# Patient Record
Sex: FEMALE | Race: WHITE | Employment: FULL TIME | URBAN - METROPOLITAN AREA
[De-identification: names, ages, dates, MRNs, and addresses within clinical notes are randomized per-mention and may not be internally consistent; named-entity substitution may affect disease eponyms.]

---

## 2018-11-12 RX ORDER — CEFAZOLIN SODIUM/WATER 2 G/20 ML
2 SYRINGE (ML) INTRAVENOUS ONCE
Status: CANCELLED | OUTPATIENT
Start: 2018-11-12 | End: 2018-11-12

## 2018-11-13 ENCOUNTER — HOSPITAL ENCOUNTER (OUTPATIENT)
Dept: SURGERY | Age: 63
Discharge: HOME | End: 2018-11-13
Attending: ORTHOPAEDIC SURGERY
Payer: COMMERCIAL

## 2018-11-13 ENCOUNTER — HOSPITAL ENCOUNTER (OUTPATIENT)
Dept: PHYSICAL THERAPY | Age: 63
Discharge: HOME | End: 2018-11-13
Payer: COMMERCIAL

## 2018-11-13 VITALS
HEART RATE: 62 BPM | OXYGEN SATURATION: 97 % | SYSTOLIC BLOOD PRESSURE: 112 MMHG | RESPIRATION RATE: 16 BRPM | TEMPERATURE: 98.2 F | WEIGHT: 172.5 LBS | BODY MASS INDEX: 28.74 KG/M2 | HEIGHT: 65 IN | DIASTOLIC BLOOD PRESSURE: 65 MMHG

## 2018-11-13 LAB
ALBUMIN SERPL-MCNC: 3.7 G/DL (ref 3.2–4.6)
ANION GAP SERPL CALC-SCNC: 9 MMOL/L
APPEARANCE UR: ABNORMAL
APTT PPP: 25.3 SEC (ref 23.2–35.3)
ATRIAL RATE: 61 BPM
BACTERIA SPEC CULT: ABNORMAL
BACTERIA URNS QL MICRO: ABNORMAL /HPF
BASOPHILS # BLD: 0.1 K/UL (ref 0–0.2)
BASOPHILS NFR BLD: 1 % (ref 0–2)
BILIRUB UR QL: NEGATIVE
BUN SERPL-MCNC: 18 MG/DL (ref 8–23)
CALCIUM SERPL-MCNC: 9.6 MG/DL (ref 8.3–10.4)
CALCULATED P AXIS, ECG09: 59 DEGREES
CALCULATED R AXIS, ECG10: 56 DEGREES
CALCULATED T AXIS, ECG11: 37 DEGREES
CASTS URNS QL MICRO: ABNORMAL /LPF
CHLORIDE SERPL-SCNC: 104 MMOL/L (ref 98–107)
CO2 SERPL-SCNC: 28 MMOL/L (ref 21–32)
COLOR UR: YELLOW
CREAT SERPL-MCNC: 0.72 MG/DL (ref 0.6–1)
DIAGNOSIS, 93000: NORMAL
DIFFERENTIAL METHOD BLD: NORMAL
EOSINOPHIL # BLD: 0.4 K/UL (ref 0–0.8)
EOSINOPHIL NFR BLD: 6 % (ref 0.5–7.8)
EPI CELLS #/AREA URNS HPF: 0 /HPF
ERYTHROCYTE [DISTWIDTH] IN BLOOD BY AUTOMATED COUNT: 14.2 %
EST. AVERAGE GLUCOSE BLD GHB EST-MCNC: 111 MG/DL
GLUCOSE SERPL-MCNC: 79 MG/DL (ref 65–100)
GLUCOSE UR STRIP.AUTO-MCNC: NEGATIVE MG/DL
HBA1C MFR BLD: 5.5 %
HCT VFR BLD AUTO: 45.3 % (ref 35.8–46.3)
HGB BLD-MCNC: 14.6 G/DL (ref 11.7–15.4)
HGB UR QL STRIP: NEGATIVE
IMM GRANULOCYTES # BLD: 0 K/UL (ref 0–0.5)
IMM GRANULOCYTES NFR BLD AUTO: 0 % (ref 0–5)
INR PPP: 0.9
KETONES UR QL STRIP.AUTO: NEGATIVE MG/DL
LEUKOCYTE ESTERASE UR QL STRIP.AUTO: ABNORMAL
LYMPHOCYTES # BLD: 1.6 K/UL (ref 0.5–4.6)
LYMPHOCYTES NFR BLD: 21 % (ref 13–44)
MCH RBC QN AUTO: 29 PG (ref 26.1–32.9)
MCHC RBC AUTO-ENTMCNC: 32.2 G/DL (ref 31.4–35)
MCV RBC AUTO: 90.1 FL (ref 79.6–97.8)
MONOCYTES # BLD: 0.7 K/UL (ref 0.1–1.3)
MONOCYTES NFR BLD: 9 % (ref 4–12)
NEUTS SEG # BLD: 4.8 K/UL (ref 1.7–8.2)
NEUTS SEG NFR BLD: 62 % (ref 43–78)
NITRITE UR QL STRIP.AUTO: NEGATIVE
NRBC # BLD: 0 K/UL (ref 0–0.2)
P-R INTERVAL, ECG05: 162 MS
PH UR STRIP: 6.5 [PH] (ref 5–9)
PLATELET # BLD AUTO: 288 K/UL (ref 150–450)
PMV BLD AUTO: 9.9 FL (ref 9.4–12.3)
POTASSIUM SERPL-SCNC: 3.8 MMOL/L (ref 3.5–5.1)
PROT UR STRIP-MCNC: NEGATIVE MG/DL
PROTHROMBIN TIME: 11.9 SEC (ref 11.5–14.5)
Q-T INTERVAL, ECG07: 388 MS
QRS DURATION, ECG06: 80 MS
QTC CALCULATION (BEZET), ECG08: 390 MS
RBC # BLD AUTO: 5.03 M/UL (ref 4.05–5.2)
RBC #/AREA URNS HPF: ABNORMAL /HPF
SERVICE CMNT-IMP: ABNORMAL
SODIUM SERPL-SCNC: 141 MMOL/L (ref 136–145)
SP GR UR REFRACTOMETRY: 1.01 (ref 1–1.02)
UROBILINOGEN UR QL STRIP.AUTO: 0.2 EU/DL (ref 0.2–1)
VENTRICULAR RATE, ECG03: 61 BPM
WBC # BLD AUTO: 7.8 K/UL (ref 4.3–11.1)
WBC URNS QL MICRO: ABNORMAL /HPF

## 2018-11-13 PROCEDURE — 87641 MR-STAPH DNA AMP PROBE: CPT

## 2018-11-13 PROCEDURE — 80048 BASIC METABOLIC PNL TOTAL CA: CPT

## 2018-11-13 PROCEDURE — 36415 COLL VENOUS BLD VENIPUNCTURE: CPT

## 2018-11-13 PROCEDURE — 81001 URINALYSIS AUTO W/SCOPE: CPT

## 2018-11-13 PROCEDURE — 82040 ASSAY OF SERUM ALBUMIN: CPT

## 2018-11-13 PROCEDURE — 85610 PROTHROMBIN TIME: CPT

## 2018-11-13 PROCEDURE — 77030027138 HC INCENT SPIROMETER -A

## 2018-11-13 PROCEDURE — 85730 THROMBOPLASTIN TIME PARTIAL: CPT

## 2018-11-13 PROCEDURE — 80307 DRUG TEST PRSMV CHEM ANLYZR: CPT

## 2018-11-13 PROCEDURE — 97161 PT EVAL LOW COMPLEX 20 MIN: CPT

## 2018-11-13 PROCEDURE — 85025 COMPLETE CBC W/AUTO DIFF WBC: CPT

## 2018-11-13 PROCEDURE — 93005 ELECTROCARDIOGRAM TRACING: CPT | Performed by: ORTHOPAEDIC SURGERY

## 2018-11-13 PROCEDURE — 83036 HEMOGLOBIN GLYCOSYLATED A1C: CPT

## 2018-11-13 RX ORDER — IBUPROFEN AND FAMOTIDINE 26.6; 8 MG/1; MG/1
1 TABLET, FILM COATED ORAL 3 TIMES DAILY
COMMUNITY
End: 2018-12-01

## 2018-11-13 RX ORDER — FISH OIL/DHA/EPA 1200-144MG
1 CAPSULE ORAL 3 TIMES DAILY
COMMUNITY
End: 2018-12-01

## 2018-11-13 RX ORDER — ASCORBIC ACID 500 MG
500 TABLET ORAL 4 TIMES DAILY
COMMUNITY

## 2018-11-13 RX ORDER — MULTIVIT WITH MINERALS/HERBS
2 TABLET ORAL 2 TIMES DAILY
COMMUNITY
End: 2018-12-01

## 2018-11-13 NOTE — ACP (ADVANCE CARE PLANNING)
[employer] for [place] received request to offer assistance with [address] in Pre-Assessment. Spoke with nurse to ensure that patient was sufficiently alert and oriented to proceed with consult. Reviewed information with patient. Ms. Reina Ledezma completed an Advance Medical Directive. Original returned to patient and copy provided to unit staff to have scanned into the medical record. Rev.  Tameka Greco MDiv, BS  Board Certified

## 2018-11-13 NOTE — PROGRESS NOTES
[place] [employer] for [facility] received request to offer assistance with [address] in Pre-Assessment. Spoke with nurse to ensure that patient was sufficiently alert and oriented to proceed with consult. Reviewed information with patient. Ms. Garrison Oliva completed an Advance Medical Directive. Original returned to patient and copy provided to unit staff to have scanned into the medical record. Rev. Claude Lawman, MDiv, BS Board Certified [employer]

## 2018-11-13 NOTE — PROGRESS NOTES
Brian Mccoy : (87 y.o.) [address] at [address], [place] [address]. [phone] Physical Therapy Prehab Plan of Treatment and Evaluation Summary:2018 ICD-10: Treatment Diagnosis:  
· Pain in Right Hip (M25.551) · Stiffness of Right Hip, Not elsewhere classified (M25.651) Precautions/Allergies:  
Patient has no allergy information on record. MEDICAL/REFERRING DIAGNOSIS: 
Unilateral primary osteoarthritis, right hip [M16.11] REFERRING PHYSICIAN: Stella Collins MD 
DATE OF SURGERY: 18 Assessment:  
Comments:  She is here with her spouse. She needs a l prakash ONCE THE R is healed. She plans on going home at [place] with her spouse's assistance PROBLEM LIST (Impacting functional limitations): Ms. Garrison Oliva presents with the following right lower extremity(s) problems: 1. Strength 2. Range of Motion 3. Home Exercise Program 
4. Pain INTERVENTIONS PLANNED: 
1. Home Exercise Program 
2. Educational Discussion TREATMENT PLAN: Effective Dates: 2018 TO 2018. Frequency/Duration: Patient to continue to perform home exercise program at least twice per day up until her surgery. GOALS: (Goals have been discussed and agreed upon with patient.) Discharge Goals: Time Frame: 1 Day 1. Patient will demonstrate independence with a home exercise program designed to increase strength, range of motion and pain control to minimize functional deficits and optimize patient for total joint replacement. Rehabilitation Potential For Stated Goals: Good Regarding [facility] therapy, I certify that the treatment plan above will be carried out by a therapist or under their direction. Thank you for this referral, Matt Son, PT     
    
 
 
HISTORY:  
Present Symptoms: 
Pain Intensity 1: 9(at its worst) Pain Location 1: Hip Pain Orientation 1: Anterior, Lateral, Medial, Posterior, Right History of Present Injury/Illness (Reason for Referral): 
Medical/Referring Diagnosis: Unilateral primary osteoarthritis, right hip [M16.11] Past Medical History/Comorbidities: Ms. Margaret Abad  has no past medical history on file. Ms. Margaret Abad  has no past surgical history on file. Social History/Living Environment:  
Home Environment: Private residence # Steps to Enter: 1 Rails to Enter: No 
One/Two Story Residence: One story Living Alone: No 
Support Systems: Spouse/Significant Other/Partner Patient Expects to be Discharged to[de-identified] Private residence Current DME Used/Available at Home: Walker, rolling, Cane, straight(comfort height toilet) Tub or Shower Type: Shower Work/Activity: 
Works as a writer, does a lot of sitting Dominant Side: 
RIGHT Current Medications:  See Pre-assessment nursing note Number of Personal Factors/Comorbidities that affect the Plan of Care: 1-2: MODERATE COMPLEXITY EXAMINATION:  
ADLs (Current Functional Status):  
Ambulation: 
[x] Independent 
[] Walk Indoors Only 
[x] Walk Outdoors [] Use Assistive Device [] Use Wheelchair Only Dressing: 
[x] Independent Requires Assistance from Someone for: 
[] Sock/Shoes 
[] Pants 
[] Everything Bathing/Showering:  
[x] Independent 
[] Requires Assistance from Someone 
[] Sponge Bath Only Household Activities: 
[] Routine house and yard work [x] Light Housework Only 
[] None Observation/Orthostatic Postural Assessment:  
 Leg length discrepancy, left(L LE 1/4\" shorter than R) ROM/Flexibility:  
AROM: Within functional limits(L LE) RLE AROM 
R Hip Flexion: 120 
R Hip ABduction: 25 Strength:  
Strength: Generally decreased, functional(L hip 3+; knee/ankle 4) RLE Strength 
R Hip Flexion: 3+ 
R Hip ABduction: 3+ 
R Knee Extension: 4 
R Ankle Dorsiflexion: 4 Functional Mobility:   
Sensation: Intact(L LE) Stand to Sit: Independent Sit to Stand: Independent Stand Pivot Transfers: Independent Distance (ft): 1000 Feet (ft) Ambulation - Level of Assistance: Modified independent Assistive Device: Cane, straight Base of Support: Widened Speed/Linda: Pace decreased (<100 feet/min) Step Length: Left shortened Stance: Right decreased Gait Abnormalities: Antalgic Balance:   
Sitting: Intact Standing: With support Body Structures Involved: 1. Bones 2. Joints 3. Muscles Body Functions Affected: 1. Neuromusculoskeletal 
2. Movement Related Activities and Participation Affected: 1. General Tasks and Demands 2. Mobility Number of elements that affect the Plan of Care: 4+: HIGH COMPLEXITY CLINICAL PRESENTATION:  
Presentation: Stable and uncomplicated: LOW COMPLEXITY CLINICAL DECISION MAKING:  
Outcome Measure: Tool Used: Lower Extremity Functional Scale (LEFS) Score:  Initial: 13/80 Most Recent: X/80 (Date: -- ) Interpretation of Score: 20 questions each scored on a 5 point scale with 0 representing \"extreme difficulty or unable to perform\" and 4 representing \"no difficulty\". The lower the score, the greater the functional disability. 80/80 represents no disability. Minimal detectable change is 9 points. Score 80 79-65 64-49 48-33 32-17 16-1 0 Modifier CH CI CJ CK CL CM CN  
 
? Mobility - Walking and Moving Around:  
  - CURRENT STATUS: CM - 80%-99% impaired, limited or restricted  - GOAL STATUS: CM - 80%-99% impaired, limited or restricted  - D/C STATUS:  CM - 80%-99% impaired, limited or restricted Medical Necessity:  
· Ms. Ding is expected to optimize her lower extremity strength and ROM in preparation for joint replacement surgery. Reason for Services/Other Comments: · Achieve baseline assesment of musculoskeletal system, functional mobility and home environment. , educate in PT HEP in preparation for surgery, educate in hospital plan of care.   
Use of outcome tool(s) and clinical judgement create a POC that gives a: Clear prediction of patient's progress: LOW COMPLEXITY  
TREATMENT:  
Treatment/Session Assessment:  Patient was instructed in PT- HEP to increase strength and ROM in LEs. Answered all questions. · Post session pain:  2 
· Compliance with Program/Exercises: compliant all of the time. Total Treatment Duration: PT Patient Time In/Time Out Time In: 1100 Time Out: [address] [place] KATHY Padilla

## 2018-11-13 NOTE — PERIOP NOTES
Lab results within anesthesia guidelines. Lab results sent to PCP per surgeon's request. Lab results sent to surgeon. Recent Results (from the past 12 hour(s)) CBC WITH AUTOMATED DIFF Collection Time: 11/13/18 10:40 AM  
Result Value Ref Range WBC 7.8 4.3 - 11.1 K/uL  
 RBC 5.03 4.05 - 5.2 M/uL  
 HGB 14.6 11.7 - 15.4 g/dL HCT 45.3 35.8 - 46.3 % MCV 90.1 79.6 - 97.8 FL  
 MCH 29.0 26.1 - 32.9 PG  
 MCHC 32.2 31.4 - 35.0 g/dL  
 RDW 14.2 % PLATELET 328 185 - 033 K/uL MPV 9.9 9.4 - 12.3 FL ABSOLUTE NRBC 0.00 0.0 - 0.2 K/uL  
 DF AUTOMATED NEUTROPHILS 62 43 - 78 % LYMPHOCYTES 21 13 - 44 % MONOCYTES 9 4.0 - 12.0 % EOSINOPHILS 6 0.5 - 7.8 % BASOPHILS 1 0.0 - 2.0 % IMMATURE GRANULOCYTES 0 0.0 - 5.0 %  
 ABS. NEUTROPHILS 4.8 1.7 - 8.2 K/UL  
 ABS. LYMPHOCYTES 1.6 0.5 - 4.6 K/UL  
 ABS. MONOCYTES 0.7 0.1 - 1.3 K/UL  
 ABS. EOSINOPHILS 0.4 0.0 - 0.8 K/UL  
 ABS. BASOPHILS 0.1 0.0 - 0.2 K/UL  
 ABS. IMM. GRANS. 0.0 0.0 - 0.5 K/UL PROTHROMBIN TIME + INR Collection Time: 11/13/18 10:40 AM  
Result Value Ref Range Prothrombin time 11.9 11.5 - 14.5 sec INR 0.9 PTT Collection Time: 11/13/18 10:40 AM  
Result Value Ref Range aPTT 25.3 23.2 - 35.3 SEC METABOLIC PANEL, BASIC Collection Time: 11/13/18 10:40 AM  
Result Value Ref Range Sodium 141 136 - 145 mmol/L Potassium 3.8 3.5 - 5.1 mmol/L Chloride 104 98 - 107 mmol/L  
 CO2 28 21 - 32 mmol/L Anion gap 9 mmol/L Glucose 79 65 - 100 mg/dL BUN 18 8 - 23 MG/DL Creatinine 0.72 0.6 - 1.0 MG/DL  
 GFR est AA >60 >60 ml/min/1.73m2 GFR est non-AA >60 ml/min/1.73m2 Calcium 9.6 8.3 - 10.4 MG/DL URINALYSIS W/ RFLX MICROSCOPIC Collection Time: 11/13/18 10:40 AM  
Result Value Ref Range Color YELLOW Appearance CLOUDY Specific gravity 1.010 1.001 - 1.023    
 pH (UA) 6.5 5.0 - 9.0 Protein NEGATIVE  NEG mg/dL Glucose NEGATIVE  mg/dL Ketone NEGATIVE  NEG mg/dL Bilirubin NEGATIVE  NEG Blood NEGATIVE  NEG Urobilinogen 0.2 0.2 - 1.0 EU/dL Nitrites NEGATIVE  NEG Leukocyte Esterase LARGE (A) NEG    
 WBC 20-50 0 /hpf  
 RBC 0-3 0 /hpf Epithelial cells 0 0 /hpf Bacteria 1+ (H) 0 /hpf Casts 0-3 0 /lpf ALBUMIN Collection Time: 11/13/18 10:40 AM  
Result Value Ref Range Albumin 3.7 3.2 - 4.6 g/dL HEMOGLOBIN A1C WITH EAG Collection Time: 11/13/18 10:40 AM  
Result Value Ref Range Hemoglobin A1c 5.5 % Est. average glucose 111 mg/dL EKG, 12 LEAD, INITIAL Collection Time: 11/13/18  1:13 PM  
Result Value Ref Range Ventricular Rate 61 BPM  
 Atrial Rate 61 BPM  
 P-R Interval 162 ms QRS Duration 80 ms  
 Q-T Interval 388 ms QTC Calculation (Bezet) 390 ms Calculated P Axis 59 degrees Calculated R Axis 56 degrees Calculated T Axis 37 degrees Diagnosis Normal sinus rhythm Normal ECG No previous ECGs available Confirmed by Bryan Fair ([id number]) on 11/13/2018 1:39:22 PM

## 2018-11-13 NOTE — PERIOP NOTES
Patient verified name and . Order for consent was found in EHR and matches case posting; patient verified. Type 3 surgery, PAT Joint assessment complete. Labs per surgeon: cbc, bmp, UA, PT, PTT, MRSA nasal swab, nicotine, HgbA1C, albumin; results pending. Labs per anesthesia protocol: No additional lab work needed. EKG:Done today- abnormal. Will have anesthesia review EKG. Pt has not had a prior EKG before today. Hibiclens and instructions to return bottle on DOS given per hospital policy. Patient provided with handouts including Guide to Surgery, Pain Management, Hand Hygiene, Blood Transfusion Education, and [place] [facility] Brochure. Patient answered medical/surgical history questions at their best of ability. All prior to admission medications documented in [facility]. Original medication prescription bottle was visualized during patient appointment. Patient instructed to hold all vitamins 7 days prior to surgery and NSAIDS 5 days prior to surgery. Medications to be held: vitamins, supplements and duexis. Patient instructed to continue previous medications as prescribed prior to surgery and to take the following medications the day of surgery according to anesthesia guidelines with a small sip of water: none. Patient teach back successful and patient demonstrates knowledge of instruction.

## 2018-11-14 LAB
COTININE UR QL SCN: NEGATIVE NG/ML
DRUG SCREEN COMMENT:, 753798: NORMAL

## 2018-11-14 NOTE — PROGRESS NOTES
11/13/18 1030 Oxygen Therapy O2 Sat (%) 95 % Pulse via Oximetry 78 beats per minute O2 Device Room air Pre-Treatment Breath Sounds Bilateral Clear Pre FEV1 (liters) 2.4 liters % Predicted 94 Incentive Spirometry Treatment Actual Volume (ml) 1750 ml Initial respiratory Assessment completed with pt. Pt was interviewed and evaluated in Joint camp prior to surgery. Patient ID: 
Salma Ang [id number] 
59 y.o. 
1955 Surgeon: Dr. Hilaria Santiago Date of Surgery: The linked surgery was not found. Please check manually. Procedure: Total Right Hip Arthroplasty Primary Care Physician: Riya, Not On File None Specialists:  
                    
          Pt instructed in the use of Incentive Spirometry. Pt instructed to bring Incentive Spirometer back on date of surgery & to start using Is upon return to pt room. Pt taught proper cough technique History of smoking:   NONE Quit date:        
 
Secondhand smoke:PARENTS Past procedures with Oxygen desaturation:NONE Past Medical History:  
Diagnosis Date  Arthritis Respiratory history:DENIES SOB Respiratory meds:  NA 
 
 
                                
FAMILY PRESENT:            SPOUSE, PAST SLEEP STUDY:                         NO 
HX OF JENELLE:                                         NO JENELLE assessment: SLEEPS ON SIDE     THEN        BACK PHYSICAL EXAM Body mass index is 28.71 kg/m². Visit Vitals /65 (BP 1 Location: Right arm, BP Patient Position: At rest;Sitting) Pulse 62 Temp 98.2 °F (36.8 °C) Resp 16 Ht 5' 5\" (1.651 m) Wt 78.2 kg (172 lb 8 oz) SpO2 97% BMI 28.71 kg/m² Neck circumference:  34  cm Loud snoring: DENIES Witnessed apnea or wakening gasping or choking:,             DENIES, Awakens with headaches:                                                  DENIES Morning or daytime tiredness/ sleepiness:                    DENIES Dry mouth or sore throat in morning:              SOME [place] stage:  2 SACS score:1 
 
STOP/BAN 
 
                         
CPAP:                       NONE Referrals: 
 
Pt. Phone Number:

## 2018-11-14 NOTE — PERIOP NOTES
NICOTINE/COTININE, UR, QT [id number] (Order [id number]) Lab Date: 11/13/2018 Department: [facility] Pre Assessment Released By: Kylie Hernandez RN (auto-released) Authorizing: Curt Keith MD  
Component Value Flag Ref Range Units Status Cotinine Screen, urine NEGATIVE    Yrwkml=154 ng/mL Final

## 2018-11-14 NOTE — PERIOP NOTES
MRSA/MSSA swab reviewed. Pharmacy will review and change antibiotic as appropriate for day of surgery.

## 2018-11-19 NOTE — ADVANCED PRACTICE NURSE
Total Joint Surgery Preoperative Chart Review Patient ID: 
Patricia Berry [id number] 
59 y.o. 
1955 Surgeon: Dr. Amanda Crane Date of Surgery: 2018 Procedure: Total Right Hip Arthroplasty Subjective:  
Patricia Berry is a 59 y.o. WHITE [place]  female who presents for preoperative evaluation for Total Right Hip arthroplasty. This is a preoperative chart review note based on data collected by the nurse at the surgical Pre-Assessment visit. Past Medical History:  
Diagnosis Date  Arthritis Past Surgical History:  
Procedure Laterality Date [address] BIOPSY Right   HX  SECTION Family History Problem Relation Age of Onset  Heart Attack Mother  Lung Disease Mother  Heart Disease Mother  Hypertension Mother [facility] Arthritis-osteo Mother  Lung Disease Father  Cancer Father   
     prostate Social History Tobacco Use  Smoking status: Passive Smoke Exposure - Never Smoker  Smokeless tobacco: Never Used Substance Use Topics  Alcohol use: No  
  Frequency: Never Prior to Admission medications Medication Sig Start Date End Date Taking? Authorizing Provider OTHER,NON-FORMULARY, Pain relief complex 1000/500/120 mg 3 tablets at breakfast, lunch and supper   Yes Provider, Historical  
OTHER,NON-FORMULARY, CarotoMax 5000- one tablet daily   Yes Provider, Historical  
cartilage/collagen/bor/hyalur (JOINT HEALTH PO) Take 2 Tabs by mouth three (3) times daily. Yes Provider, Historical  
OTHER,NON-FORMULARY, Gentle Sleep complex one tablet at bedtime   Yes Provider, Historical  
OTHER,NON-FORMULARY, menopause balance complex one tablet at lunch   Yes Provider, Historical  
fish oil-dha-epa 1,200-144-216 mg cap Take 1 Tab by mouth three (3) times daily.    Yes Provider, Historical  
OTHER,NON-FORMULARY, Herb-Lax 3 tablets per day   Yes Provider, Historical  
ascorbic acid, vitamin C, (VITAMIN C) 500 mg tablet Take 500 mg by mouth four (4) times daily. Yes Provider, Historical  
OTHER,NON-FORMULARY, Lynnette-E complex one tablet TID   Yes Provider, Historical  
LECITHIN PO Take  by mouth two (2) times a day. Yes Provider, Historical  
OTHER,NON-FORMULARY, Take 1 Tab by mouth daily. Mindworks   Yes Provider, Historical  
OTHER,NON-FORMULARY, Ez-gest one tablet daily   Yes Provider, Historical  
B.infantis-B.ani-B.long-B.bifi (PROBIOTIC 4X) 10-15 mg TbEC Take 1 Tab by mouth daily. Yes Provider, Historical  
OTHER,NON-FORMULARY, Sustained release VitalMag 1 tablet daily   Yes Provider, Historical  
OTHER,NON-FORMULARY, Stomach soothing complex 1 tablet TID   Yes Provider, Historical  
OTHER,NON-FORMULARY, Immunity formula 1 one tablet daily   Yes Provider, Historical  
OTHER,NON-FORMULARY, Garlic complex 1 tablet twice daily   Yes Provider, Historical  
OTHER,NON-FORMULARY, Lynnette-Justine 3 tablets TID   Yes Provider, Historical  
ALFALFA PO Take 1 Tab by mouth four (4) times daily. Yes Provider, Historical  
b complex vitamins (B COMPLEX 1) tablet Take 2 Tabs by mouth two (2) times a day. Yes Provider, Historical  
OTHER,NON-FORMULARY, osteomatrix 0.5 serving at breakfast and lunch, one serving at bedtime   Yes Provider, Historical  
ibuprofen-famotidine (DUEXIS) 800-26.6 mg tab Take 1 Tab by mouth three (3) times daily. Yes Provider, Historical  
 
No Known Allergies Objective:  
 
Physical Exam:  
/65 (BP 1 Location: Right arm, BP Patient Position: At rest;Sitting) Pulse 62 Temp 98.2 °F (36.8 °C) Resp 16 Ht 5' 5\" (1.651 m) Wt 78.2 kg (172 lb 8 oz) SpO2 97% BMI 28.71 kg/m² ECG:   
EKG Results Procedure 720 Value Units Date/Time EKG, 12 LEAD, INITIAL [[id number]] Collected:  11/13/18 1313 Order Status:  Completed Updated:  11/13/18 1339 Ventricular Rate 61 BPM   
  Atrial Rate 61 BPM   
  P-R Interval 162 ms QRS Duration 80 ms   
  Q-T Interval 388 ms QTC Calculation (Bezet) 390 ms Calculated P Axis 59 degrees Calculated R Axis 56 degrees Calculated T Axis 37 degrees Diagnosis --  
  Normal sinus rhythm Normal ECG No previous ECGs available Confirmed by Jenny Martinez ([id number]) on 11/13/2018 1:39:22 PM 
  
  
 
 
Data Review:  
Labs:  
 
Results for Tawnya Burks (MRN [id number]) as of 11/19/2018 11:45 Ref. Range 11/13/2018 10:40 Sodium Latest Ref Range: 136 - 145 mmol/L 141 Potassium Latest Ref Range: 3.5 - 5.1 mmol/L 3.8 Chloride Latest Ref Range: 98 - 107 mmol/L 104 CO2 Latest Ref Range: 21 - 32 mmol/L 28 Anion gap Latest Units: mmol/L 9 Glucose Latest Ref Range: 65 - 100 mg/dL 79 BUN Latest Ref Range: 8 - 23 MG/DL 18 Creatinine Latest Ref Range: 0.6 - 1.0 MG/DL 0.72 Calcium Latest Ref Range: 8.3 - 10.4 MG/DL 9.6 GFR est non-AA Latest Units: ml/min/1.73m2 >60  
GFR est AA Latest Ref Range: >60 ml/min/1.73m2 >60 Albumin Latest Ref Range: 3.2 - 4.6 g/dL 3.7 Hemoglobin A1c, (calculated) Latest Units: % 5.5 Est. average glucose Latest Units: mg/dL 111 Total Joint Surgery Pre-Assessment Recommendations:      
none Signed By: Raven Waldrpo NP-C November 19, 2018

## 2018-11-19 NOTE — PERIOP NOTES
Pt called to say that her PCP is Brent Jacobs NP with [facility]. Updated in EMR for PAT appt. Pt also had questions about equipment after surgery- pt informed our  will meet with her after surgery in the hospital to determine equipment needs and ordering.

## 2018-11-29 ENCOUNTER — ANESTHESIA EVENT (OUTPATIENT)
Dept: SURGERY | Age: 63
DRG: 470 | End: 2018-11-29
Payer: COMMERCIAL

## 2018-11-30 ENCOUNTER — HOSPITAL ENCOUNTER (INPATIENT)
Age: 63
LOS: 1 days | Discharge: HOME HEALTH | DRG: 470 | End: 2018-12-01
Attending: ORTHOPAEDIC SURGERY | Admitting: ORTHOPAEDIC SURGERY
Payer: COMMERCIAL

## 2018-11-30 ENCOUNTER — APPOINTMENT (OUTPATIENT)
Dept: GENERAL RADIOLOGY | Age: 63
DRG: 470 | End: 2018-11-30
Attending: ORTHOPAEDIC SURGERY
Payer: COMMERCIAL

## 2018-11-30 ENCOUNTER — ANESTHESIA (OUTPATIENT)
Dept: SURGERY | Age: 63
DRG: 470 | End: 2018-11-30
Payer: COMMERCIAL

## 2018-11-30 DIAGNOSIS — M16.11 PRIMARY OSTEOARTHRITIS OF RIGHT HIP: Primary | ICD-10-CM

## 2018-11-30 PROBLEM — M16.9 OA (OSTEOARTHRITIS) OF HIP: Status: ACTIVE | Noted: 2018-11-30

## 2018-11-30 LAB
ABO + RH BLD: NORMAL
APPEARANCE UR: CLEAR
BILIRUB UR QL: NEGATIVE
BLOOD GROUP ANTIBODIES SERPL: NORMAL
COLOR UR: YELLOW
GLUCOSE BLD STRIP.AUTO-MCNC: 88 MG/DL (ref 65–100)
GLUCOSE UR STRIP.AUTO-MCNC: NEGATIVE MG/DL
HGB BLD-MCNC: 12.7 G/DL (ref 11.7–15.4)
HGB UR QL STRIP: NEGATIVE
KETONES UR QL STRIP.AUTO: NEGATIVE MG/DL
LEUKOCYTE ESTERASE UR QL STRIP.AUTO: NEGATIVE
NITRITE UR QL STRIP.AUTO: NEGATIVE
PH UR STRIP: 7 [PH] (ref 5–9)
PROT UR STRIP-MCNC: NEGATIVE MG/DL
SP GR UR REFRACTOMETRY: 1 (ref 1–1.02)
SPECIMEN EXP DATE BLD: NORMAL
UROBILINOGEN UR QL STRIP.AUTO: 0.2 EU/DL (ref 0.2–1)

## 2018-11-30 PROCEDURE — 74011250636 HC RX REV CODE- 250/636

## 2018-11-30 PROCEDURE — 77030007880 HC KT SPN EPDRL BBMI -B: Performed by: ANESTHESIOLOGY

## 2018-11-30 PROCEDURE — 97110 THERAPEUTIC EXERCISES: CPT

## 2018-11-30 PROCEDURE — 76060000035 HC ANESTHESIA 2 TO 2.5 HR: Performed by: ORTHOPAEDIC SURGERY

## 2018-11-30 PROCEDURE — 77030003666 HC NDL SPINAL BD -A: Performed by: ORTHOPAEDIC SURGERY

## 2018-11-30 PROCEDURE — 74011000250 HC RX REV CODE- 250

## 2018-11-30 PROCEDURE — 77030002933 HC SUT MCRYL J&J -A: Performed by: ORTHOPAEDIC SURGERY

## 2018-11-30 PROCEDURE — 77030011266 HC ELECTRD BLD INSL COVD -A: Performed by: ORTHOPAEDIC SURGERY

## 2018-11-30 PROCEDURE — 74011250636 HC RX REV CODE- 250/636: Performed by: ORTHOPAEDIC SURGERY

## 2018-11-30 PROCEDURE — 94760 N-INVAS EAR/PLS OXIMETRY 1: CPT

## 2018-11-30 PROCEDURE — 97530 THERAPEUTIC ACTIVITIES: CPT

## 2018-11-30 PROCEDURE — 97165 OT EVAL LOW COMPLEX 30 MIN: CPT

## 2018-11-30 PROCEDURE — 86580 TB INTRADERMAL TEST: CPT | Performed by: ORTHOPAEDIC SURGERY

## 2018-11-30 PROCEDURE — 77030033138 HC SUT PGA STRATFX J&J -B: Performed by: ORTHOPAEDIC SURGERY

## 2018-11-30 PROCEDURE — 97161 PT EVAL LOW COMPLEX 20 MIN: CPT

## 2018-11-30 PROCEDURE — 74011000302 HC RX REV CODE- 302: Performed by: ORTHOPAEDIC SURGERY

## 2018-11-30 PROCEDURE — 77030033067 HC SUT PDO STRATFX SPIR J&J -B: Performed by: ORTHOPAEDIC SURGERY

## 2018-11-30 PROCEDURE — 74011250636 HC RX REV CODE- 250/636: Performed by: ANESTHESIOLOGY

## 2018-11-30 PROCEDURE — 74011250637 HC RX REV CODE- 250/637: Performed by: ORTHOPAEDIC SURGERY

## 2018-11-30 PROCEDURE — 77030018836 HC SOL IRR NACL ICUM -A: Performed by: ORTHOPAEDIC SURGERY

## 2018-11-30 PROCEDURE — 82962 GLUCOSE BLOOD TEST: CPT

## 2018-11-30 PROCEDURE — 65270000029 HC RM PRIVATE

## 2018-11-30 PROCEDURE — 77030019557 HC ELECTRD VES SEAL MEDT -F: Performed by: ORTHOPAEDIC SURGERY

## 2018-11-30 PROCEDURE — 77030018074 HC RTVR SUT ARTH4 S&N -B: Performed by: ORTHOPAEDIC SURGERY

## 2018-11-30 PROCEDURE — 77030020782 HC GWN BAIR PAWS FLX 3M -B: Performed by: ANESTHESIOLOGY

## 2018-11-30 PROCEDURE — 85018 HEMOGLOBIN: CPT

## 2018-11-30 PROCEDURE — 74011250637 HC RX REV CODE- 250/637: Performed by: ANESTHESIOLOGY

## 2018-11-30 PROCEDURE — 77030006777 HC BLD SAW OSC CNMD -B: Performed by: ORTHOPAEDIC SURGERY

## 2018-11-30 PROCEDURE — 77030003544 HC NDL EPDRL BD -A: Performed by: ANESTHESIOLOGY

## 2018-11-30 PROCEDURE — 0SR90JA REPLACEMENT OF RIGHT HIP JOINT WITH SYNTHETIC SUBSTITUTE, UNCEMENTED, OPEN APPROACH: ICD-10-PCS | Performed by: ORTHOPAEDIC SURGERY

## 2018-11-30 PROCEDURE — 77030013727 HC IRR FAN PULSVC ZIMM -B: Performed by: ORTHOPAEDIC SURGERY

## 2018-11-30 PROCEDURE — 72170 X-RAY EXAM OF PELVIS: CPT

## 2018-11-30 PROCEDURE — 77030002922 HC SUT FBRWRE ARTH -B: Performed by: ORTHOPAEDIC SURGERY

## 2018-11-30 PROCEDURE — 77030034479 HC ADH SKN CLSR PRINEO J&J -B: Performed by: ORTHOPAEDIC SURGERY

## 2018-11-30 PROCEDURE — 77030034849: Performed by: ORTHOPAEDIC SURGERY

## 2018-11-30 PROCEDURE — 74011000250 HC RX REV CODE- 250: Performed by: ORTHOPAEDIC SURGERY

## 2018-11-30 PROCEDURE — 86901 BLOOD TYPING SEROLOGIC RH(D): CPT

## 2018-11-30 PROCEDURE — 76210000006 HC OR PH I REC 0.5 TO 1 HR: Performed by: ORTHOPAEDIC SURGERY

## 2018-11-30 PROCEDURE — 36415 COLL VENOUS BLD VENIPUNCTURE: CPT

## 2018-11-30 PROCEDURE — C1776 JOINT DEVICE (IMPLANTABLE): HCPCS | Performed by: ORTHOPAEDIC SURGERY

## 2018-11-30 PROCEDURE — 76010000162 HC OR TIME 1.5 TO 2 HR INTENSV-TIER 1: Performed by: ORTHOPAEDIC SURGERY

## 2018-11-30 PROCEDURE — 81003 URINALYSIS AUTO W/O SCOPE: CPT

## 2018-11-30 DEVICE — 132 DEGREE NECK ANGLE HIP STEM
Type: IMPLANTABLE DEVICE | Site: HIP | Status: FUNCTIONAL
Brand: ACCOLADE

## 2018-11-30 DEVICE — 10 DEGREE POLYETHYLENE INSERT
Type: IMPLANTABLE DEVICE | Site: HIP | Status: FUNCTIONAL
Brand: TRIDENT

## 2018-11-30 DEVICE — CERAMIC V40 FEMORAL HEAD
Type: IMPLANTABLE DEVICE | Site: HIP | Status: FUNCTIONAL
Brand: BIOLOX

## 2018-11-30 DEVICE — CANCELLOUS BONE SCREW
Type: IMPLANTABLE DEVICE | Site: HIP | Status: FUNCTIONAL
Brand: TORX

## 2018-11-30 DEVICE — SHELL ACET CLUS H 54E TRTANIUM -- TRIDENT II: Type: IMPLANTABLE DEVICE | Site: HIP | Status: FUNCTIONAL

## 2018-11-30 RX ORDER — NEOMYCIN AND POLYMYXIN B SULFATES 40; 200000 MG/ML; [USP'U]/ML
SOLUTION IRRIGATION AS NEEDED
Status: DISCONTINUED | OUTPATIENT
Start: 2018-11-30 | End: 2018-11-30 | Stop reason: HOSPADM

## 2018-11-30 RX ORDER — KETOROLAC TROMETHAMINE 15 MG/ML
15 INJECTION, SOLUTION INTRAMUSCULAR; INTRAVENOUS EVERY 8 HOURS
Status: DISCONTINUED | OUTPATIENT
Start: 2018-11-30 | End: 2018-12-01 | Stop reason: HOSPADM

## 2018-11-30 RX ORDER — SODIUM CHLORIDE 0.9 % (FLUSH) 0.9 %
5-10 SYRINGE (ML) INJECTION AS NEEDED
Status: DISCONTINUED | OUTPATIENT
Start: 2018-11-30 | End: 2018-11-30 | Stop reason: HOSPADM

## 2018-11-30 RX ORDER — ACETAMINOPHEN 500 MG
1000 TABLET ORAL EVERY 6 HOURS
Status: DISCONTINUED | OUTPATIENT
Start: 2018-12-01 | End: 2018-12-01 | Stop reason: HOSPADM

## 2018-11-30 RX ORDER — CELECOXIB 200 MG/1
200 CAPSULE ORAL EVERY 12 HOURS
Status: DISCONTINUED | OUTPATIENT
Start: 2018-11-30 | End: 2018-12-01 | Stop reason: HOSPADM

## 2018-11-30 RX ORDER — DEXAMETHASONE SODIUM PHOSPHATE 4 MG/ML
INJECTION, SOLUTION INTRA-ARTICULAR; INTRALESIONAL; INTRAMUSCULAR; INTRAVENOUS; SOFT TISSUE AS NEEDED
Status: DISCONTINUED | OUTPATIENT
Start: 2018-11-30 | End: 2018-11-30 | Stop reason: HOSPADM

## 2018-11-30 RX ORDER — LIDOCAINE HYDROCHLORIDE 10 MG/ML
0.3 INJECTION INFILTRATION; PERINEURAL ONCE
Status: COMPLETED | OUTPATIENT
Start: 2018-11-30 | End: 2018-11-30

## 2018-11-30 RX ORDER — NALOXONE HYDROCHLORIDE 0.4 MG/ML
.2-.4 INJECTION, SOLUTION INTRAMUSCULAR; INTRAVENOUS; SUBCUTANEOUS
Status: DISCONTINUED | OUTPATIENT
Start: 2018-11-30 | End: 2018-12-01 | Stop reason: HOSPADM

## 2018-11-30 RX ORDER — SODIUM CHLORIDE 0.9 % (FLUSH) 0.9 %
5-10 SYRINGE (ML) INJECTION AS NEEDED
Status: DISCONTINUED | OUTPATIENT
Start: 2018-11-30 | End: 2018-12-01 | Stop reason: HOSPADM

## 2018-11-30 RX ORDER — TRANEXAMIC ACID 650 1/1
1300 TABLET ORAL
Status: COMPLETED | OUTPATIENT
Start: 2018-11-30 | End: 2018-11-30

## 2018-11-30 RX ORDER — CYCLOBENZAPRINE HCL 10 MG
5 TABLET ORAL 3 TIMES DAILY
Status: DISCONTINUED | OUTPATIENT
Start: 2018-11-30 | End: 2018-12-01 | Stop reason: HOSPADM

## 2018-11-30 RX ORDER — SODIUM CHLORIDE 9 MG/ML
INJECTION INTRAMUSCULAR; INTRAVENOUS; SUBCUTANEOUS AS NEEDED
Status: DISCONTINUED | OUTPATIENT
Start: 2018-11-30 | End: 2018-11-30 | Stop reason: HOSPADM

## 2018-11-30 RX ORDER — SODIUM CHLORIDE, SODIUM LACTATE, POTASSIUM CHLORIDE, CALCIUM CHLORIDE 600; 310; 30; 20 MG/100ML; MG/100ML; MG/100ML; MG/100ML
INJECTION, SOLUTION INTRAVENOUS
Status: DISCONTINUED | OUTPATIENT
Start: 2018-11-30 | End: 2018-11-30 | Stop reason: HOSPADM

## 2018-11-30 RX ORDER — ASCORBIC ACID 500 MG
500 TABLET ORAL 4 TIMES DAILY
Status: DISCONTINUED | OUTPATIENT
Start: 2018-11-30 | End: 2018-12-01 | Stop reason: HOSPADM

## 2018-11-30 RX ORDER — BUPIVACAINE HYDROCHLORIDE 7.5 MG/ML
INJECTION, SOLUTION INTRASPINAL
Status: COMPLETED | OUTPATIENT
Start: 2018-11-30 | End: 2018-11-30

## 2018-11-30 RX ORDER — ONDANSETRON 8 MG/1
8 TABLET, ORALLY DISINTEGRATING ORAL
Status: DISCONTINUED | OUTPATIENT
Start: 2018-11-30 | End: 2018-12-01 | Stop reason: HOSPADM

## 2018-11-30 RX ORDER — DEXAMETHASONE SODIUM PHOSPHATE 100 MG/10ML
10 INJECTION INTRAMUSCULAR; INTRAVENOUS ONCE
Status: DISCONTINUED | OUTPATIENT
Start: 2018-12-01 | End: 2018-12-01 | Stop reason: HOSPADM

## 2018-11-30 RX ORDER — CEFAZOLIN SODIUM/WATER 2 G/20 ML
2 SYRINGE (ML) INTRAVENOUS ONCE
Status: DISCONTINUED | OUTPATIENT
Start: 2018-11-30 | End: 2018-11-30 | Stop reason: SDUPTHER

## 2018-11-30 RX ORDER — ASPIRIN 325 MG
325 TABLET, DELAYED RELEASE (ENTERIC COATED) ORAL EVERY 12 HOURS
Qty: 60 TAB | Refills: 0 | Status: SHIPPED | OUTPATIENT
Start: 2018-11-30

## 2018-11-30 RX ORDER — SODIUM CHLORIDE 0.9 % (FLUSH) 0.9 %
5-10 SYRINGE (ML) INJECTION EVERY 8 HOURS
Status: DISCONTINUED | OUTPATIENT
Start: 2018-11-30 | End: 2018-12-01 | Stop reason: HOSPADM

## 2018-11-30 RX ORDER — CEFAZOLIN SODIUM/WATER 2 G/20 ML
2 SYRINGE (ML) INTRAVENOUS EVERY 8 HOURS
Status: COMPLETED | OUTPATIENT
Start: 2018-11-30 | End: 2018-12-01

## 2018-11-30 RX ORDER — DEXAMETHASONE SODIUM PHOSPHATE 100 MG/10ML
10 INJECTION INTRAMUSCULAR; INTRAVENOUS ONCE
Status: DISCONTINUED | OUTPATIENT
Start: 2018-12-01 | End: 2018-11-30

## 2018-11-30 RX ORDER — GABAPENTIN 100 MG/1
100 CAPSULE ORAL 2 TIMES DAILY
Status: DISCONTINUED | OUTPATIENT
Start: 2018-11-30 | End: 2018-12-01 | Stop reason: HOSPADM

## 2018-11-30 RX ORDER — CELECOXIB 200 MG/1
200 CAPSULE ORAL ONCE
Status: COMPLETED | OUTPATIENT
Start: 2018-11-30 | End: 2018-11-30

## 2018-11-30 RX ORDER — MIDAZOLAM HYDROCHLORIDE 1 MG/ML
INJECTION, SOLUTION INTRAMUSCULAR; INTRAVENOUS AS NEEDED
Status: DISCONTINUED | OUTPATIENT
Start: 2018-11-30 | End: 2018-11-30 | Stop reason: HOSPADM

## 2018-11-30 RX ORDER — OXYCODONE HYDROCHLORIDE 5 MG/1
10 TABLET ORAL
Status: DISCONTINUED | OUTPATIENT
Start: 2018-11-30 | End: 2018-11-30 | Stop reason: HOSPADM

## 2018-11-30 RX ORDER — GABAPENTIN 100 MG/1
100 CAPSULE ORAL 2 TIMES DAILY
Qty: 30 CAP | Refills: 0 | Status: SHIPPED | OUTPATIENT
Start: 2018-11-30

## 2018-11-30 RX ORDER — ACETAMINOPHEN 500 MG
1000 TABLET ORAL ONCE
Status: DISCONTINUED | OUTPATIENT
Start: 2018-11-30 | End: 2018-11-30 | Stop reason: HOSPADM

## 2018-11-30 RX ORDER — SODIUM CHLORIDE, SODIUM LACTATE, POTASSIUM CHLORIDE, CALCIUM CHLORIDE 600; 310; 30; 20 MG/100ML; MG/100ML; MG/100ML; MG/100ML
100 INJECTION, SOLUTION INTRAVENOUS CONTINUOUS
Status: DISCONTINUED | OUTPATIENT
Start: 2018-11-30 | End: 2018-11-30 | Stop reason: HOSPADM

## 2018-11-30 RX ORDER — CEFAZOLIN SODIUM/WATER 2 G/20 ML
2 SYRINGE (ML) INTRAVENOUS ONCE
Status: COMPLETED | OUTPATIENT
Start: 2018-11-30 | End: 2018-11-30

## 2018-11-30 RX ORDER — HYDROMORPHONE HYDROCHLORIDE 2 MG/ML
1 INJECTION, SOLUTION INTRAMUSCULAR; INTRAVENOUS; SUBCUTANEOUS
Status: DISCONTINUED | OUTPATIENT
Start: 2018-11-30 | End: 2018-12-01 | Stop reason: HOSPADM

## 2018-11-30 RX ORDER — EPHEDRINE SULFATE 50 MG/ML
INJECTION, SOLUTION INTRAVENOUS AS NEEDED
Status: DISCONTINUED | OUTPATIENT
Start: 2018-11-30 | End: 2018-11-30 | Stop reason: HOSPADM

## 2018-11-30 RX ORDER — TRANEXAMIC ACID 100 MG/ML
INJECTION, SOLUTION INTRAVENOUS AS NEEDED
Status: DISCONTINUED | OUTPATIENT
Start: 2018-11-30 | End: 2018-11-30 | Stop reason: HOSPADM

## 2018-11-30 RX ORDER — SODIUM CHLORIDE 9 MG/ML
100 INJECTION, SOLUTION INTRAVENOUS CONTINUOUS
Status: DISCONTINUED | OUTPATIENT
Start: 2018-11-30 | End: 2018-12-01 | Stop reason: HOSPADM

## 2018-11-30 RX ORDER — ASPIRIN 325 MG
325 TABLET, DELAYED RELEASE (ENTERIC COATED) ORAL EVERY 12 HOURS
Status: DISCONTINUED | OUTPATIENT
Start: 2018-11-30 | End: 2018-12-01 | Stop reason: HOSPADM

## 2018-11-30 RX ORDER — HYDROMORPHONE HYDROCHLORIDE 2 MG/1
2 TABLET ORAL
Qty: 42 TAB | Refills: 0 | Status: SHIPPED | OUTPATIENT
Start: 2018-11-30

## 2018-11-30 RX ORDER — CYCLOBENZAPRINE HCL 10 MG
5 TABLET ORAL 3 TIMES DAILY
Qty: 30 TAB | Refills: 0 | Status: SHIPPED | OUTPATIENT
Start: 2018-11-30

## 2018-11-30 RX ORDER — AMOXICILLIN 250 MG
2 CAPSULE ORAL DAILY
Qty: 120 TAB | Refills: 0 | Status: SHIPPED | OUTPATIENT
Start: 2018-12-01

## 2018-11-30 RX ORDER — AMOXICILLIN 250 MG
2 CAPSULE ORAL DAILY
Status: DISCONTINUED | OUTPATIENT
Start: 2018-12-01 | End: 2018-12-01 | Stop reason: HOSPADM

## 2018-11-30 RX ORDER — ROPIVACAINE HYDROCHLORIDE 2 MG/ML
INJECTION, SOLUTION EPIDURAL; INFILTRATION; PERINEURAL AS NEEDED
Status: DISCONTINUED | OUTPATIENT
Start: 2018-11-30 | End: 2018-11-30 | Stop reason: HOSPADM

## 2018-11-30 RX ORDER — KETOROLAC TROMETHAMINE 30 MG/ML
INJECTION, SOLUTION INTRAMUSCULAR; INTRAVENOUS AS NEEDED
Status: DISCONTINUED | OUTPATIENT
Start: 2018-11-30 | End: 2018-11-30 | Stop reason: HOSPADM

## 2018-11-30 RX ORDER — ONDANSETRON 2 MG/ML
INJECTION INTRAMUSCULAR; INTRAVENOUS AS NEEDED
Status: DISCONTINUED | OUTPATIENT
Start: 2018-11-30 | End: 2018-11-30 | Stop reason: HOSPADM

## 2018-11-30 RX ORDER — CELECOXIB 200 MG/1
200 CAPSULE ORAL EVERY 12 HOURS
Qty: 60 CAP | Refills: 2 | Status: SHIPPED | OUTPATIENT
Start: 2018-11-30 | End: 2019-02-28

## 2018-11-30 RX ORDER — ACETAMINOPHEN 500 MG
1000 TABLET ORAL EVERY 6 HOURS
Qty: 120 TAB | Refills: 0 | Status: SHIPPED | OUTPATIENT
Start: 2018-12-01

## 2018-11-30 RX ORDER — HYDROMORPHONE HYDROCHLORIDE 2 MG/ML
0.5 INJECTION, SOLUTION INTRAMUSCULAR; INTRAVENOUS; SUBCUTANEOUS
Status: DISCONTINUED | OUTPATIENT
Start: 2018-11-30 | End: 2018-11-30 | Stop reason: HOSPADM

## 2018-11-30 RX ORDER — FENTANYL CITRATE 50 UG/ML
INJECTION, SOLUTION INTRAMUSCULAR; INTRAVENOUS AS NEEDED
Status: DISCONTINUED | OUTPATIENT
Start: 2018-11-30 | End: 2018-11-30 | Stop reason: HOSPADM

## 2018-11-30 RX ORDER — ACETAMINOPHEN 10 MG/ML
1000 INJECTION, SOLUTION INTRAVENOUS ONCE
Status: COMPLETED | OUTPATIENT
Start: 2018-11-30 | End: 2018-11-30

## 2018-11-30 RX ORDER — SODIUM CHLORIDE 0.9 % (FLUSH) 0.9 %
5-10 SYRINGE (ML) INJECTION EVERY 8 HOURS
Status: DISCONTINUED | OUTPATIENT
Start: 2018-11-30 | End: 2018-11-30 | Stop reason: HOSPADM

## 2018-11-30 RX ORDER — PROPOFOL 10 MG/ML
INJECTION, EMULSION INTRAVENOUS
Status: DISCONTINUED | OUTPATIENT
Start: 2018-11-30 | End: 2018-11-30 | Stop reason: HOSPADM

## 2018-11-30 RX ORDER — MIDAZOLAM HYDROCHLORIDE 1 MG/ML
2 INJECTION, SOLUTION INTRAMUSCULAR; INTRAVENOUS
Status: COMPLETED | OUTPATIENT
Start: 2018-11-30 | End: 2018-11-30

## 2018-11-30 RX ORDER — HYDROMORPHONE HYDROCHLORIDE 2 MG/1
2 TABLET ORAL
Status: DISCONTINUED | OUTPATIENT
Start: 2018-11-30 | End: 2018-12-01 | Stop reason: HOSPADM

## 2018-11-30 RX ORDER — DIPHENHYDRAMINE HCL 25 MG
25 CAPSULE ORAL
Status: DISCONTINUED | OUTPATIENT
Start: 2018-11-30 | End: 2018-12-01 | Stop reason: HOSPADM

## 2018-11-30 RX ORDER — ONDANSETRON 8 MG/1
8 TABLET, ORALLY DISINTEGRATING ORAL
Qty: 30 TAB | Refills: 0 | Status: SHIPPED | OUTPATIENT
Start: 2018-11-30

## 2018-11-30 RX ADMIN — FENTANYL CITRATE 50 MCG: 50 INJECTION, SOLUTION INTRAMUSCULAR; INTRAVENOUS at 11:49

## 2018-11-30 RX ADMIN — SODIUM CHLORIDE 100 ML/HR: 900 INJECTION, SOLUTION INTRAVENOUS at 17:00

## 2018-11-30 RX ADMIN — ACETAMINOPHEN 1000 MG: 500 TABLET, FILM COATED ORAL at 23:21

## 2018-11-30 RX ADMIN — BUPIVACAINE HYDROCHLORIDE 14 MG: 7.5 INJECTION, SOLUTION INTRASPINAL at 11:51

## 2018-11-30 RX ADMIN — Medication 3 AMPULE: at 09:12

## 2018-11-30 RX ADMIN — EPHEDRINE SULFATE 15 MG: 50 INJECTION, SOLUTION INTRAVENOUS at 12:10

## 2018-11-30 RX ADMIN — GABAPENTIN 100 MG: 100 CAPSULE ORAL at 20:06

## 2018-11-30 RX ADMIN — DEXAMETHASONE SODIUM PHOSPHATE 10 MG: 4 INJECTION, SOLUTION INTRA-ARTICULAR; INTRALESIONAL; INTRAMUSCULAR; INTRAVENOUS; SOFT TISSUE at 12:31

## 2018-11-30 RX ADMIN — HYDROMORPHONE HYDROCHLORIDE 0.5 MG: 2 INJECTION, SOLUTION INTRAMUSCULAR; INTRAVENOUS; SUBCUTANEOUS at 14:37

## 2018-11-30 RX ADMIN — FENTANYL CITRATE 50 MCG: 50 INJECTION, SOLUTION INTRAMUSCULAR; INTRAVENOUS at 11:57

## 2018-11-30 RX ADMIN — LIDOCAINE HYDROCHLORIDE 0.3 ML: 10 INJECTION, SOLUTION INFILTRATION; PERINEURAL at 09:13

## 2018-11-30 RX ADMIN — MIDAZOLAM 2 MG: 1 INJECTION INTRAMUSCULAR; INTRAVENOUS at 11:13

## 2018-11-30 RX ADMIN — Medication 1 AMPULE: at 20:06

## 2018-11-30 RX ADMIN — Medication 2 G: at 11:57

## 2018-11-30 RX ADMIN — TRANEXAMIC ACID 1000 MG: 100 INJECTION, SOLUTION INTRAVENOUS at 11:52

## 2018-11-30 RX ADMIN — TRANEXAMIC ACID 1300 MG: 650 TABLET, FILM COATED ORAL at 20:06

## 2018-11-30 RX ADMIN — Medication 2 G: at 20:07

## 2018-11-30 RX ADMIN — MIDAZOLAM HYDROCHLORIDE 1 MG: 1 INJECTION, SOLUTION INTRAMUSCULAR; INTRAVENOUS at 13:04

## 2018-11-30 RX ADMIN — TRANEXAMIC ACID 1300 MG: 650 TABLET, FILM COATED ORAL at 23:21

## 2018-11-30 RX ADMIN — HYDROMORPHONE HYDROCHLORIDE 2 MG: 2 TABLET ORAL at 16:38

## 2018-11-30 RX ADMIN — PROPOFOL 25 MCG/KG/MIN: 10 INJECTION, EMULSION INTRAVENOUS at 12:08

## 2018-11-30 RX ADMIN — TRANEXAMIC ACID 1000 MG: 100 INJECTION, SOLUTION INTRAVENOUS at 13:14

## 2018-11-30 RX ADMIN — SODIUM CHLORIDE, SODIUM LACTATE, POTASSIUM CHLORIDE, CALCIUM CHLORIDE: 600; 310; 30; 20 INJECTION, SOLUTION INTRAVENOUS at 11:43

## 2018-11-30 RX ADMIN — CYCLOBENZAPRINE HYDROCHLORIDE 5 MG: 10 TABLET, FILM COATED ORAL at 21:39

## 2018-11-30 RX ADMIN — ONDANSETRON 4 MG: 2 INJECTION INTRAMUSCULAR; INTRAVENOUS at 12:31

## 2018-11-30 RX ADMIN — Medication 10 ML: at 21:40

## 2018-11-30 RX ADMIN — ACETAMINOPHEN 1000 MG: 10 INJECTION, SOLUTION INTRAVENOUS at 18:24

## 2018-11-30 RX ADMIN — EPHEDRINE SULFATE 10 MG: 50 INJECTION, SOLUTION INTRAVENOUS at 12:24

## 2018-11-30 RX ADMIN — MIDAZOLAM HYDROCHLORIDE 1 MG: 1 INJECTION, SOLUTION INTRAMUSCULAR; INTRAVENOUS at 11:49

## 2018-11-30 RX ADMIN — ASPIRIN 325 MG: 325 TABLET, DELAYED RELEASE ORAL at 20:06

## 2018-11-30 RX ADMIN — KETOROLAC TROMETHAMINE 15 MG: 15 INJECTION, SOLUTION INTRAMUSCULAR; INTRAVENOUS at 21:39

## 2018-11-30 RX ADMIN — EPHEDRINE SULFATE 15 MG: 50 INJECTION, SOLUTION INTRAVENOUS at 12:00

## 2018-11-30 RX ADMIN — CELECOXIB 200 MG: 200 CAPSULE ORAL at 20:06

## 2018-11-30 RX ADMIN — EPHEDRINE SULFATE 10 MG: 50 INJECTION, SOLUTION INTRAVENOUS at 13:09

## 2018-11-30 RX ADMIN — OXYCODONE HYDROCHLORIDE AND ACETAMINOPHEN 500 MG: 500 TABLET ORAL at 21:39

## 2018-11-30 RX ADMIN — CELECOXIB 200 MG: 200 CAPSULE ORAL at 09:12

## 2018-11-30 RX ADMIN — SODIUM CHLORIDE, SODIUM LACTATE, POTASSIUM CHLORIDE, AND CALCIUM CHLORIDE 100 ML/HR: 600; 310; 30; 20 INJECTION, SOLUTION INTRAVENOUS at 09:12

## 2018-11-30 RX ADMIN — SODIUM CHLORIDE, SODIUM LACTATE, POTASSIUM CHLORIDE, CALCIUM CHLORIDE: 600; 310; 30; 20 INJECTION, SOLUTION INTRAVENOUS at 12:19

## 2018-11-30 RX ADMIN — HYDROMORPHONE HYDROCHLORIDE 0.5 MG: 2 INJECTION, SOLUTION INTRAMUSCULAR; INTRAVENOUS; SUBCUTANEOUS at 15:37

## 2018-11-30 RX ADMIN — TUBERCULIN PURIFIED PROTEIN DERIVATIVE 5 UNITS: 5 INJECTION, SOLUTION INTRADERMAL at 09:12

## 2018-11-30 RX ADMIN — MIDAZOLAM HYDROCHLORIDE 1 MG: 1 INJECTION, SOLUTION INTRAMUSCULAR; INTRAVENOUS at 11:57

## 2018-11-30 NOTE — ANESTHESIA PREPROCEDURE EVALUATION
Anesthetic History No history of anesthetic complications Review of Systems / Medical History Patient summary reviewed and pertinent labs reviewed Pulmonary Within defined limits Neuro/Psych Within defined limits Cardiovascular Exercise tolerance: >4 METS 
  
GI/Hepatic/Renal 
Within defined limits Endo/Other Arthritis Other Findings Physical Exam 
 
Airway Mallampati: I 
TM Distance: 4 - 6 cm Neck ROM: normal range of motion Mouth opening: Normal 
 
 Cardiovascular Rhythm: regular Rate: normal 
 
 
 
 Dental 
No notable dental hx Pulmonary Breath sounds clear to auscultation Abdominal 
 
 
 
 Other Findings Anesthetic Plan ASA: 1 Anesthesia type: spinal 
 
 
 
 
 
Anesthetic plan and risks discussed with: Patient and Spouse

## 2018-11-30 NOTE — PROGRESS NOTES
Problem: Self Care Deficits Care Plan (Adult) Goal: *Acute Goals and Plan of Care (Insert Text) GOALS:  
DISCHARGE GOALS (in preparation for going home/rehab):  3 days 1. Ms. Anselmo Barajas will perform one lower body dressing activity with minimal assistance with adaptive equipment to demonstrate improved functional mobility and safety. 2.  Ms. Anselmo Barajas will perform one lower body bathing activity with minimal  assistance with adaptive equipment to demonstrate improved functional mobility and safety. 3.  Ms. Anselmo Barajas will perform toileting/toilet transfer with contact guard assistance with adaptive equipment to demonstrate improved functional mobility and safety. 4.  Ms. Anselmo Barajas will perform shower transfer with contact guard assistance with adaptive equipment to demonstrate improved functional mobility and safety. 5.  Ms. Anselmo Barajas will state NURA precautions with two verbal cues to demonstrate improved functional mobility and safety. JOINT CAMP OCCUPATIONAL THERAPY NURA: Initial Assessment 11/30/2018INPATIENT: Hospital Day: 1 Payor: [address] [place] [address] Hwy / Plan: [facility] [employer] / Product Type: Commerical /  
  
NAME/AGE/GENDER: Taj Castañeda is a 59 y.o. female PRIMARY DIAGNOSIS:  Localized osteoarthrosis of right hip [M16.11] Procedure(s) and Anesthesia Type: 
   * HIP ARTHROPLASTY TOTAL/ RIGHT/ XIOMARA - Spinal (Right) ICD-10: Treatment Diagnosis:  
 · Pain in Right Hip (M25.551) · Stiffness of Right Hip, Not elsewhere classified (M25.651) ASSESSMENT:  
Ms. Anselmo Barajas is s/p right NURA and presents with decreased weight bearing on right LE and decreased independence with functional mobility and activities of daily living as compared to prior level of function and safety. Patient would benefit from skilled Occupational Therapy to maximize independence and safety with self-care task and functional mobility.   Pt would also benefit from education on lower body adaptive equipment and hip precautions post-surgery in preparation for going home Patient plans for further rehab at home with home health services and good family support . OT reviewed therapy schedule and plan of care with patient. Patient was able to transfer and perform self care skills as charted below. Patient instructed to call for assistance when needing to get up from the recliner and all needs in reach. Patient verbalized understanding of call light. This section established at most recent assessment PROBLEM LIST (Impairments causing functional limitations): 1. Decreased Strength 2. Decreased ADL/Functional Activities 3. Decreased Transfer Abilities 4. Increased Pain 5. Increased Fatigue 6. Decreased Flexibility/Joint Mobility 7. Decreased Knowledge of Precautions INTERVENTIONS PLANNED: (Benefits and precautions of occupational therapy have been discussed with the patient.) 1. Activities of daily living training 2. Adaptive equipment training 3. Balance training 4. Clothing management 5. Donning&doffing training 6. Theraputic activity TREATMENT PLAN: Frequency/Duration: Follow patient 1-2 times to address above goals. Rehabilitation Potential For Stated Goals: Good RECOMMENDED REHABILITATION/EQUIPMENT: (at time of discharge pending progress): Continue Skilled Therapy and Home Health: Physical Therapy. OCCUPATIONAL PROFILE AND HISTORY:  
History of Present Injury/Illness (Reason for Referral): Pt presents this date s/p (right) NURA. Past Medical History/Comorbidities: Ms. Andrew Jacobo  has a past medical history of Arthritis. Ms. Andrew Jacobo  has a past surgical history that includes hx breast biopsy (Right, ) and hx  section. Social History/Living Environment:  
Home Environment: Private residence Living Alone: No 
Support Systems: Spouse/Significant Other/Partner, Child(karlie) Patient Expects to be Discharged to[de-identified] Private residence Current DME Used/Available at Home: None Prior Level of Function/Work/Activity: 
Independent Number of Personal Factors/Comorbidities that affect the Plan of Care: Brief history (0):  LOW COMPLEXITY ASSESSMENT OF OCCUPATIONAL PERFORMANCE[de-identified]  
Most Recent Physical Functioning:  
Balance Sitting: Intact Standing: Pull to stand; With support Coordination Fine Motor Skills-Upper: Left Intact; Right Intact Gross Motor Skills-Upper: Left Intact; Right Intact Mental Status Neurologic State: Alert Orientation Level: Oriented X4 Cognition: Appropriate decision making Perception: Appears intact Perseveration: No perseveration noted Safety/Judgement: Awareness of environment Basic ADLs (From Assessment) Complex ADLs (From Assessment) Basic ADL Feeding: Independent Oral Facial Hygiene/Grooming: Supervision Bathing: Moderate assistance Upper Body Dressing: Supervision Lower Body Dressing: Moderate assistance Grooming/Bathing/Dressing Activities of Daily Living Cognitive Retraining Safety/Judgement: Awareness of environment Functional Transfers Bathroom Mobility: Minimum assistance Toilet Transfer : Minimum assistance Shower Transfer: Minimum assistance Bed/Mat Mobility Supine to Sit: Contact guard assistance Bed to Chair: Minimum assistance Scooting: Additional time Physical Skills Involved: 1. Range of Motion 2. Balance 3. Strength Cognitive Skills Affected (resulting in the inability to perform in a timely and safe manner): 1. none Psychosocial Skills Affected: 1. Environmental Adaptation Number of elements that affect the Plan of Care: 3-5:  MODERATE COMPLEXITY CLINICAL DECISION MAKING:  
[employer] 6 Clicks Daily Activity Inpatient Short Form How much help from another person does the patient currently need. .. Total A Lot A Little None 1.  Putting on and taking off regular lower body clothing? [] 1   [x] 2   [] 3   [] 4  
2. Bathing (including washing, rinsing, drying)? [] 1   [x] 2   [] 3   [] 4  
3. Toileting, which includes using toilet, bedpan or urinal?   [] 1   [] 2   [x] 3   [] 4  
4. Putting on and taking off regular upper body clothing? [] 1   [] 2   [] 3   [x] 4  
5. Taking care of personal grooming such as brushing teeth? [] 1   [] 2   [] 3   [x] 4  
6. Eating meals? [] 1   [] 2   [] 3   [x] 4  
© 2007, Trustees of [address] [place] [address] [address] [address], under license to [employer]. All rights reserved Score:  Initial: 19 Most Recent: X (Date: -- ) Interpretation of Tool:  Represents activities that are increasingly more difficult (i.e. Bed mobility, Transfers, Gait). Score 24 23 22-20 19-15 14-10 9-7 6 Modifier CH CI CJ CK CL CM CN   
 
? Self Care:  
  - CURRENT STATUS: CK - 40%-59% impaired, limited or restricted  - GOAL STATUS: CJ - 20%-39% impaired, limited or restricted  - D/C STATUS:  ---------------To be determined--------------- Payor: [employer] / Plan: [place] [employer] / Product Type: Commerical /   
 
Medical Necessity:    
· Patient is expected to demonstrate progress in range of motion, balance and functional technique to increase independence with self care. Reason for Services/Other Comments: 
· Patient would benefit from skilled Occupational Therapy to maximize independence and safety with self-care task and functional mobility. Silvino Callahan Use of outcome tool(s) and clinical judgement create a POC that gives a: LOW COMPLEXITY  
  
 
 
 
TREATMENT:  
(In addition to Assessment/Re-Assessment sessions the following treatments were rendered) Pre-treatment Symptoms/Complaints:   
Pain: Initial:  
   Post Session:  0 Therapeutic Activity: (    8min):   Therapeutic activities including Bed transfers, Chair transfers, Ambulation on level ground and  to improve mobility, strength and balance. Required minimal   to promote dynamic balance in standing. Treatment/Session Assessment:   
 Response to Treatment:  Pt up to edge of bed and to chair tolerated well. Education: 
[] Home Exercises [x] Fall Precautions [x] Hip Precautions [] Going Home Video 
[] Knee/Hip Prosthesis Review [x] Walker Management/Safety [x] Adaptive Equipment as Needed Interdisciplinary Collaboration:  
o Physical Therapist 
o Occupational Therapist 
o Registered Nurse After treatment position/precautions:  
o Up in chair 
o Bed/Chair-wheels locked 
o Call light within reach 
o RN notified 
o Family at bedside Compliance with Program/Exercises: Compliant all of the time. Recommendations/Intent for next treatment session:  Treatment next visit will focus on increasing Ms. Ding's independence with bed mobility, transfers, self care, functional mobility, modalities for pain, and patient education. Total Treatment Duration: OT Patient Time In/Time Out Time In: 1700 Time Out: 1589 Eloisa Ya, OT

## 2018-11-30 NOTE — ANESTHESIA PROCEDURE NOTES
Spinal Block Start time: 11/30/2018 11:48 AM 
End time: 11/30/2018 11:51 AM 
Performed by: Harish Brown MD 
Authorized by: Harish Brown MD  
 
Pre-procedure: Indications: primary anesthetic  Preanesthetic Checklist: patient identified, risks and benefits discussed, anesthesia consent, site marked, patient being monitored and timeout performed Timeout Time: 11:46 Spinal Block:  
Patient Position:  Seated Prep Region:  Lumbar Location:  L3-4 Technique:  Single shot Needle:  
Needle Type:  Quincke Needle Gauge:  25 G Attempts:  1 Events: CSF confirmed, no blood with aspiration and no paresthesia Assessment: 
Insertion:  Uncomplicated Patient tolerance:  Patient tolerated the procedure well with no immediate complications

## 2018-11-30 NOTE — PERIOP NOTES
TRANSFER - OUT REPORT: 
 
Verbal report given to [facility] RN on Julisa Tang  being transferred to joint camp room  322 for routine progression of care Report consisted of patients Situation, Background, Assessment and  
Recommendations(SBAR). Information from the following report(s) SBAR, MAR and Accordion was reviewed with the receiving nurse. Opportunity for questions and clarification was provided. Patient transported with: 
 Mobule

## 2018-11-30 NOTE — PROGRESS NOTES
TRANSFER - IN REPORT: 
 
Verbal report received from Abdulkadir Sinclair RN on Ambrosio Nickel  being received from PACU for routine post - op Report consisted of patients Situation, Background, Assessment and  
Recommendations(SBAR). Information from the following report(s) SBAR, Intake/Output and MAR was reviewed with the receiving nurse. Opportunity for questions and clarification was provided. Assessment completed upon patients arrival to unit and care assumed. Oriented to room, bed controls, and how to order meals. ABDs and tape to R hip dry and intact. SCDs to LEs. Yellow gripper socks to feet and instructed not to get up without staff to assist. Moving feet but still has numbness. Medicated for pain with dilaudid po.  in room.

## 2018-11-30 NOTE — PROGRESS NOTES
Ambulated in up with therapist. Back in [address] [place] [address]. NV checks WDL.  in room. Pt tolerated regular diet. No complaints. Ice pack to hip.

## 2018-11-30 NOTE — PERIOP NOTES
Betadine lavage:  17.5cc of betadine lot #[id number]  , exp. Date: 01/20  , 
in 500cc of . 9NS Lot #  [id number], exp. Date : [id number].

## 2018-11-30 NOTE — OP NOTES
Total Hip Procedure Note    Rodney Roberts,  [id number],  1955    Pre-operative Diagnosis:  Localized osteoarthrosis of right hip [M16.11]    Post-operative Diagnosis: Localized osteoarthrosis of right hip [M16.11]    Procedure: Right Total Hip Arthroplasty    BMI: Body mass index is 28.62 kg/m². Kit Graham Location: [address] [place] [address]    Surgeon: Effie Hogan MD    Assistant: None    Anesthesia: Spinal     Complications: None    EBL: 200cc    Drains: None    Case Note: It should be noted that no first assist help was available to help in retraction and/or wound closure. Therefore, if the case took longer than average is should noted that the absence of a first assistant contributed to the increased length of time. In addition, any delay in starting a case in a flip room is assuredly associated with the fact that I had no first assist help and had to close and breakdown drapes myself. Intra-op Findings: Pre-operatively leg lengths were assessed using preop Xrays and with the patient in the lateral decubitus position and operative leg was felt to be 2-3mm shorter in length compared to the contralateral leg. The operative hip showed notable cartilage loss of both the femoral head and acetabulum. No intra-operative periprosthetic fracture was encountered. Sciatic nerve was noted to be intact at the end of the procedure. We measured the distance of our resected femoral head/neck from the cut surface to the center of the femoral head to be approximately 32mm. The overall length replaced with the implanted head/neck was 35mm. Intra-operatively we felt that we restored the patients leg lengths to equal lengths using the method described later. Postop with the patient supine we assessed leg lengths and felt they were similar. Patient condition at completion of Procedure: Stable    Implants:   Implant Name Type Inv.  Item Serial No.  Lot No. LRB No. Used   SHELL [facility] H 54E TRTANIUM -- TRIDENT II - [id number]  SHELL ACET CLUS H 54E TRTANIUM -- Dorthea Chavez II [id number] [employer] HOWM [id number] Right 1   SCR BNE ACET CANC 6.5X35MM -- TRIDENT - [id number]  SCR BNE ACET CANC 6.5X35MM -- TRIDENT [id number] [employer] HOWM [id number] Right 1   LINER ACET TRIDENT 10 DEG SZ E --  - [id number]  LINER ACET TRIDENT 10 DEG SZ E --  [id number] [employer] HOWM [id number] Right 1   HEAD FEM DELT V40 +0MM NK 36MM -- V40 BIOLOX - [id number]  HEAD FEM DELT V40 +0MM NK 36MM -- V40 BIOLOX [id number] [employer] HOWM [id number] Right 1   STEM FEM SZ 5 132D [id number] -- ACCOLADE II V40 - [id number]  STEM FEM SZ 5 132D [id number] -- [employer] [id number] [employer] HOWM [id number] Right 1       Description of Procedure    The complexity of the total joint surgery requires the use of a first assistant for positioning, retraction and assistance in closure. However, none was provided/available    Susan Fletcher was brought to the operating room. Patient was transferred from the stretcher to OR bed. Spinal anesthetic was induced. Hernandez catheter was placed. IV antibiotics were administered per protocol. Susan Fletcher was positioned in the lateral decubitus position and the pelvis/torso stabilized with posts. The limb was prepped and draped in the usual sterile fashion. A time out identifying the patient, procedure, operative side and surgeon was administered and charted by the OR Nurse. Prior to incision one gram of TXA was given intravenously. A standard posterior approach was utilized to expose the hip. The incision was carried through the subcutaneous tissue and underlying fascia with hemostasis obtained using the bovie cauterization and Aquamantys. A Charmley retractor was inserted. We resected any redundent bursal tissue off the external rotators. We were able to identify the piriformis tendon.  The short external rotators and capsule were dissected off the posterior femur as a single layer just superior to the piriformis tendon. The sciatic nerve was palpated and protected during dissection. The femoral head was dislocated. The articular surface revealed loss of cartilage, exposed bone and bone spurring. The neck was osteotomized approximately 1cm above the top of the lesser trochanter. We were careful to protect the greater trochanter during osteotomy and protect it from iatrogenic injury. Acetabular retractors were placed both anterior and posterior just superficial to the acetabular labrum. Remaining acetabular labrum was resected and any soft tissue was removed from the acetabulum including any tissue within the codyloid fossa. The acetabular surface revealed loss of cartilage with exposed bone. The acetabulum was sequentially reamed noting proper anteversion and inclination during reaming. The transverse acetabular ligament was used as the primary anatomic landmark to determine version and inclination. The acetabulum was sized to a 54 mm acetabular component. The prepared acetabulum was irrigated of any residual reamings and soft tissue. The permanent acetabular component was impacted into place to achieve appropriate horizontal tilt, anteversion, bone coverage and stability. We visualize that the acetabular component was fully seated. A trial liner was impacted into position. We did not have to excise overhanging osteophytes anterior and posterior  in order to minimize the risk of impingement. We then turned our attention to the proximal femur. A 2-prong proximal femoral retractor was placed. We gained access to the proximal femur using a  and femoral canal finder. The canal was prepared with appropriate lateralization in which we used the initial smaller broaches to remove lateral bone to avoid varus placement of the femoral component. The canal was then broached progressively. The broach was positioned with the appropriate anatomic femoral anteversion. We broached up to a size 5 Accolade II stem. A calcar planar was used. A trial reduction with a size #5 132 degree Accolade II stem with a +0 neck length and 36 mm head was performed. This was found to be the most stable to flexion greater than 90 degrees and on internal and external rotation. Limb lengths were assessed using three techniques. First, the position of the tip of the operative greater trochanter was compared to the center of the femoral head component and was felt to match this same anatomic relationship as the non-operative hip based on preoperative X-rays. Next, we measured the length of our resected femoral head neck and felt that the trial components matched this resected length as closely as possible when accounting for the length provided by the femoral neck/head. Finally, we compared leg lengths by comparing the possible of the patella with the patients heels together with the patient in the lateral decubitus position. Using these methods it was felt that the patients leg lengths were equilibrated and with appropriate stability as mentioned above. All trial components were removed. Prior to final polyethylene insertion one screw was placed to further stabilize the cup. The final liner was impacted into place which was a 10 degree elevated liner. A cementless size 5 132 degree Accolade II stem was impacted into place carefully. We were careful to observe the calcar region for any iatrogenic fractures during insertion. The permanent femoral head was impacted into place which was a +0mm 36mm ceramic head. Margot Ding's hip was reduced and stability was as mentioned above. Dilute Betadine solution was allowed to sit in the surgical site for a 3 minute period and copious saline was used to irrigate the surgical site. The sciatic nerve was palpated and noted to be intact.  A periarticular of ropivicaine, toradol, and morphine was injected about the surgical site with care being taken not to inject in the vicinity of neurovascular structures. Prior to wound closure one additional gram of TXA was given for a total of 2 grams. The capsule was repaired with a three #2 Fiberwires secured through drill holes placed in the posterior aspect of the trochanter. No drain was placed. The fascia was closed with a  #0 Bidirectional Stratafix barbed suture. The sub-Q layer was closed with 2-0 Vicryl suture and a  3-0 moncril stratafix suture in a running subcuticular fashion was used to close the skin. The incision site was thoroughly cleaned with alcohol and the Exofin wound closure system was applied to seal it off from external contamination after the overlying skin was thoroughly cleaned with alcohol. A thin layer of [place] lubricant was applied over the wound to keep the dressing from adhering to the overlying sterile bandage and said bandage was placed. Drapes were then broken down and patient was transferred carefully back to the OR stretcher. The sponge and needle counts were correct. The patient tolerated the procedure without difficulty and left the operating room in satisfactory condition.     Signed By: Axel Pierce MD

## 2018-11-30 NOTE — ANESTHESIA POSTPROCEDURE EVALUATION
Procedure(s): HIP ARTHROPLASTY TOTAL/ RIGHT/ XIOMARA. Anesthesia Post Evaluation Multimodal analgesia: multimodal analgesia used between 6 hours prior to anesthesia start to PACU discharge Patient location during evaluation: bedside Patient participation: complete - patient participated Level of consciousness: awake Pain management: adequate Airway patency: patent Anesthetic complications: no 
Cardiovascular status: acceptable and stable Respiratory status: acceptable and nasal cannula Hydration status: acceptable Post anesthesia nausea and vomiting:  none Visit Vitals /63 (BP 1 Location: Right arm, BP Patient Position: At rest) Pulse 66 Temp 36.2 °C (97.2 °F) Resp 16 Ht 5' 5\" (1.651 m) Wt 78 kg (171 lb 15.3 oz) SpO2 98% BMI 28.62 kg/m²

## 2018-11-30 NOTE — PROGRESS NOTES
Problem: Mobility Impaired (Adult and Pediatric) Goal: *Acute Goals and Plan of Care (Insert Text) GOALS (1-4 days): 
(1.)Ms. Ding will move from supine to sit and sit to supine  in bed with MODIFIED INDEPENDENCE. (2.)Ms. Ding will transfer from bed to chair and chair to bed with SUPERVISION using the least restrictive device. (3.)Ms. Ding will ambulate with SUPERVISION for 200 feet with the least restrictive device. (4.)Ms. Ding will ambulate up/down 1 steps with No railing with STAND BY ASSIST with walker. (5.)Ms. Ding will state/observe NURA precautions with No verbal cues. ________________________________________________________________________________________________ PHYSICAL THERAPY Joint camp Nura: Initial Assessment, Treatment Day: Day of Assessment, PM 11/30/2018INPATIENT: Hospital Day: 1 Payor: [id number] / Plan: [facility], [facility]. / Product Type: Commerical /  
  
NAME/AGE/GENDER: Ambrosio Joiner is a 59 y.o. female PRIMARY DIAGNOSIS:  Localized osteoarthrosis of right hip [M16.11] Procedure(s) and Anesthesia Type: 
   * HIP ARTHROPLASTY TOTAL/ RIGHT/ XIOMARA - Spinal (Right) ICD-10: Treatment Diagnosis:  
 · Pain in Right Hip (M25.551) · Stiffness of Right Hip, Not elsewhere classified (M25.651) · Difficulty in walking, Not elsewhere classified (R26.2) ASSESSMENT:  
Ms. Paul Giang presents with impaired strength & mobility s/p right NURA. Pt also had decreased stability during out of bed activity. Pt will benefit from follow up therapy to help restore safe function prior to returning home with caregiver. This section established at most recent assessment PROBLEM LIST (Impairments causing functional limitations): 1. Decreased Strength 2. Decreased ADL/Functional Activities 3. Decreased Transfer Abilities 4. Decreased Ambulation Ability/Technique 5. Decreased Balance 6. Increased Pain 7. Decreased Activity Tolerance 8. Decreased Knowledge of Precautions 9. Decreased Cherry with Home Exercise Program 
 INTERVENTIONS PLANNED: (Benefits and precautions of physical therapy have been discussed with the patient.) 1. Bed Mobility 2. Gait Training 3. Home Exercise Program (HEP) 4. Therapeutic Exercise/Strengthening 5. Transfer Training 6. Range of Motion: active/assisted/passive 7. Therapeutic Activities 8. Group Therapy TREATMENT PLAN: Frequency/Duration: Follow patient BID for duration of hospital stay to address above goals. Rehabilitation Potential For Stated Goals: Good RECOMMENDED REHABILITATION/EQUIPMENT: (at time of discharge pending progress): Continue Skilled Therapy and Home Health: Physical Therapy. HISTORY:  
History of Present Injury/Illness (Reason for Referral): Right NURA Past Medical History/Comorbidities: Ms. Shelly Vaughna  has a past medical history of Arthritis. Ms. Shelly Vaughan  has a past surgical history that includes hx breast biopsy (Right, ) and hx  section. Social History/Living Environment:  
Home Environment: Private residence # Steps to Enter: 1 Rails to Enter: No 
One/Two Story Residence: One story Living Alone: No 
Support Systems: Spouse/Significant Other/Partner Patient Expects to be Discharged to[de-identified] Private residence Current DME Used/Available at Home: None Prior Level of Function/Work/Activity: Pt was functioning with a cane ~ 6 weeks prior to this admission Number of Personal Factors/Comorbidities that affect the Plan of Care: 1-2: MODERATE COMPLEXITY EXAMINATION:  
Most Recent Physical Functioning:  
Gross Assessment: Yes Gross Assessment AROM: Generally decreased, functional(left LE) Strength: Generally decreased, functional(left LE) Coordination: Generally decreased, functional(left LE) Bed Mobility Supine to Sit: Contact guard assistance Sit to Supine: Contact guard assistance Scooting: Contact guard assistance Transfers Stand to Sit: Contact guard assistance Stand Pivot Transfers: Contact guard assistance Bed to Chair: Contact guard assistance(with walker) Balance Sitting: Intact; Without support Standing: Impaired; With support(walker) Weight Bearing Status Right Side Weight Bearing: As tolerated Distance (ft): 100 Feet (ft) Ambulation - Level of Assistance: Contact guard assistance Assistive Device: Walker, rolling Speed/Linda: Delayed Step Length: Left shortened Stance: Right decreased Gait Abnormalities: Antalgic;Decreased step clearance Braces/Orthotics: none Right Hip Cold Type: Cold/ice packs Body Structures Involved: 1. Joints 2. Muscles Body Functions Affected: 1. Sensory/Pain 2. Movement Related Activities and Participation Affected: 1. General Tasks and Demands 2. Mobility Number of elements that affect the Plan of Care: 4+: HIGH COMPLEXITY CLINICAL PRESENTATION:  
Presentation: Stable and uncomplicated: LOW COMPLEXITY CLINICAL DECISION MAKIN[address] [place] [id number] AM-PAC 6 Clicks Basic Mobility Inpatient Short Form How much difficulty does the patient currently have. .. Unable A Lot A Little None 1. Turning over in bed (including adjusting bedclothes, sheets and blankets)? [] 1   [] 2   [x] 3   [] 4  
2. Sitting down on and standing up from a chair with arms ( e.g., wheelchair, bedside commode, etc.)   [] 1   [] 2   [x] 3   [] 4  
3. Moving from lying on back to sitting on the side of the bed? [] 1   [] 2   [x] 3   [] 4 How much help from another person does the patient currently need. .. Total A Lot A Little None 4. Moving to and from a bed to a chair (including a wheelchair)? [] 1   [] 2   [x] 3   [] 4  
5. Need to walk in hospital room? [] 1   [] 2   [x] 3   [] 4  
6. Climbing 3-5 steps with a railing? [x] 1   [] 2   [] 3   [] 4  
© , Trustees of [address] [place] [address], under license to [employer]. All rights reserved Score:  Initial: 16 Most Recent: X (Date: -- ) Interpretation of Tool:  Represents activities that are increasingly more difficult (i.e. Bed mobility, Transfers, Gait). Score 24 23 22-20 19-15 14-10 9-7 6 Modifier CH CI CJ CK CL CM CN   
 
? Mobility - Walking and Moving Around:  
  - CURRENT STATUS: CK - 40%-59% impaired, limited or restricted  - GOAL STATUS: CJ - 20%-39% impaired, limited or restricted  - D/C STATUS:  ---------------To be determined--------------- Payor: [employer]. / Plan: [place] [employer]. / Product Type: Commerical /   
 
Medical Necessity:    
· Patient is expected to demonstrate progress in strength, balance, coordination and functional technique to decrease assistance required with bed mobility, trasnfers & gait. Reason for Services/Other Comments: 
· Patient continues to require skilled intervention due to pt not independent with functional mobility. Use of outcome tool(s) and clinical judgement create a POC that gives a: Clear prediction of patient's progress: LOW COMPLEXITY  
  
 
 
 
TREATMENT:  
(In addition to Assessment/Re-Assessment sessions the following treatments were rendered) Pre-treatment Symptoms/Complaints:  none Pain: Initial: visual scale Pain Intensity 1: 3 Pain Location 1: Hip Pain Orientation 1: Right Pain Intervention(s) 1: Ambulation/Increased Activity, Cold pack  Post Session:  3/10 Therapeutic Exercise: (13 Minutes):  Exercises per grid below to improve mobility and dynamic movement of leg - right to improve functional endurance. Required minimal verbal cues to promote proper body alignment and promote proper body mechanics. Progressed repetitions as indicated. Assessment/ 12 min Date: 
11/30 Date: 
 Date: 
  
ACTIVITY/EXERCISE AM PM AM PM AM PM  
GROUP THERAPY  []  []  []  []  []  [] Ankle Pumps  10 Quad Sets  10 Gluteal Sets  10 Hip ABd/ADduction  10      
 Straight Leg Raises  --      
Knee Slides  10 Short Arc Quads  10 [address]  10 Chair Slides B = bilateral; AA = active assistive; A = active; P = passive Treatment/Session Assessment:   
 Response to Treatment:  Tolerated well. Education: 
[x] Home Exercises [x] Fall Precautions [x] Hip Precautions [] D/C Instruction Review 
[] Knee/Hip Prosthesis Review [x] Walker Management/Safety [] Adaptive Equipment as Needed Interdisciplinary Collaboration:  
o Registered Nurse 
o Certified Nursing Assistant/Patient Care Technician After treatment position/precautions:  
o Up in chair 
o Bed/Chair-wheels locked 
o Caregiver at bedside 
o Call light within reach 
o RN notified 
o Family at bedside Compliance with Program/Exercises: Will assess as treatment progresses. Recommendations/Intent for next treatment session:  Treatment next visit will focus on increasing Ms. Ding's independence with bed mobility, transfers, gait training, strength/ROM exercises, modalities for pain, and patient education. Total Treatment Duration: PT Patient Time In/Time Out Time In: 1800 Time Out: 3073 Coralee Dark, PT

## 2018-11-30 NOTE — H&P
[facility] Pre Operative History and Physical Exam 
 
Patient ID: 
Alber Sanchez [id number] 
59 y.o. 
1955 Today: 2018 CC:  Right hip pain HPI:   The patient has end stage arthritis of the right hip. The patient was evaluated and examined in the office prior to today and was found to have a history on physical exam consistent with intra-articular pathology of the right hip. Patient complains of anterior groin pain predominately. Pain occurs during activity. Patient has difficulty putting on socks/shoes and has notable pain when getting up from a chair and getting out of a car. Patient does not complain of significant lateral hip pain or radicular pain down the leg. There have been no changes to the patient's orthopedic condition since the last office visit Past Medical History: 
Past Medical History:  
Diagnosis Date  Arthritis Past Surgical History: 
Past Surgical History:  
Procedure Laterality Date [address] BIOPSY Right   HX  SECTION Medications: 
  
Prior to Admission medications Medication Sig Start Date End Date Taking? Authorizing Provider OTHER,NON-FORMULARY, Pain relief complex 1000/500/120 mg 3 tablets at breakfast, lunch and supper    Provider, Historical  
OTHER,NON-FORMULARY, CarotoMax 5000- one tablet daily    Provider, Historical  
cartilage/collagen/bor/hyalur (JOINT HEALTH PO) Take 2 Tabs by mouth three (3) times daily. Provider, Historical  
OTHER,NON-FORMULARY, Gentle Sleep complex one tablet at bedtime    Provider, Historical  
OTHER,NON-FORMULARY, menopause balance complex one tablet at lunch    Provider, Historical  
fish oil-dha-epa 1,200-144-216 mg cap Take 1 Tab by mouth three (3) times daily.     Provider, Historical  
OTHER,NON-FORMULARY, Herb-Lax 3 tablets per day    Provider, Historical  
ascorbic acid, vitamin C, (VITAMIN C) 500 mg tablet Take 500 mg by mouth four (4) times daily. Provider, Historical  
OTHER,NON-FORMULARY, Lynnette-E complex one tablet TID    Provider, Historical  
LECITHIN PO Take  by mouth two (2) times a day. Provider, Historical  
OTHER,NON-FORMULARY, Take 1 Tab by mouth daily. Mindworks    Provider, Historical  
OTHER,NON-FORMULARY, Ez-gest one tablet daily    Provider, Historical  
B.infantis-B.ani-B.long-B.bifi (PROBIOTIC 4X) 10-15 mg TbEC Take 1 Tab by mouth daily. Provider, Historical  
OTHER,NON-FORMULARY, Sustained release VitalMag 1 tablet daily    Provider, Historical  
OTHER,NON-FORMULARY, Stomach soothing complex 1 tablet TID    Provider, Historical  
OTHER,NON-FORMULARY, Immunity formula 1 one tablet daily    Provider, Historical  
OTHER,NON-FORMULARY, Garlic complex 1 tablet twice daily    Provider, Historical  
OTHER,NON-FORMULARY, Lynnette-Justine 3 tablets TID    Provider, Historical  
ALFALFA PO Take 1 Tab by mouth four (4) times daily. Provider, Historical  
b complex vitamins (B COMPLEX 1) tablet Take 2 Tabs by mouth two (2) times a day. Provider, Historical  
OTHER,NON-FORMULARY, osteomatrix 0.5 serving at breakfast and lunch, one serving at bedtime    Provider, Historical  
ibuprofen-famotidine (DUEXIS) 800-26.6 mg tab Take 1 Tab by mouth three (3) times daily. Provider, Historical  
 
 
Family History: 
  
Family History Problem Relation Age of Onset  Heart Attack Mother  Lung Disease Mother  Heart Disease Mother  Hypertension Mother [address] Arthritis-osteo Mother  Lung Disease Father  Cancer Father   
     prostate Social History:  
  
Social History Tobacco Use  Smoking status: Passive Smoke Exposure - Never Smoker  Smokeless tobacco: Never Used Substance Use Topics  Alcohol use: No  
  Frequency: Never Allergies:  
 No Known Allergies Vitals:  
  
Visit Vitals Ht 5' 5\" (1.651 m) Wt 78 kg (171 lb 15.3 oz) BMI 28.62 kg/m² Objective: General: No Acute distress HEENT: Normocephalic/atramatic Lungs:  Breathing non-labored Heart:   RRR Abdomen: soft Extremities:  Pain with ROM of the right hip which manifests as anterior groin pain. There is decreased internal and external rotation of the affected hip. No significant pain with palpation over the greater trochanteric bursa. No radicular pain with straight leg raise. Patient walks with and antalgic gait. Distally patient has no neurologic deficit. There is no problem list on file for this patient. Assessment: 1. Arthritis of the Right hip Plan: The patient has failed previous conservative treatment for this condition. The patient has pain in the left hip which causes daily symptoms which affects the patient's activities of daily living and quality of life. The risks, benefits, alternatives and possible complications of right total hip arthroplasty have been discussed with the patient including but not limited to infection, bleeding, damage to nerves and blood vessels with particular attention given to risk of damage of the femoral, obturator, lateral femoral cutaneous, superior gluteal, inferior gluteal, and sciatic nerve, risk of dislocation, fracture both intra-op and post-op, limb length inequality, polyethylene wear, implant loosening, risk for continued pain, and risk for revision surgery secondary to but not limited to all of these discussed risks. Further we discussed risk of venous thrombo-embolism including deep vein thrombosis and pulmonary embolism despite being on prophylaxis. We have also previously discussed the potential of morbidity and mortality associated with, but not limited to, the actual surgical procedure, anesthesia, prior medical conditions, and/or the administration of medications perioperatively.  I have made no guarantees to the patient regarding outcomes and the patient has voiced understanding of that. The patient has been given the opportunity to ask questions all of which have been answered and the patient wishes to proceed. The patient will be admitted the day of surgery for right total hip replacement. Signed By: Alejo Martinez MD 
November 30, 2018

## 2018-12-01 ENCOUNTER — HOME HEALTH ADMISSION (OUTPATIENT)
Dept: HOME HEALTH SERVICES | Facility: HOME HEALTH | Age: 63
End: 2018-12-01
Payer: COMMERCIAL

## 2018-12-01 VITALS
WEIGHT: 171.96 LBS | SYSTOLIC BLOOD PRESSURE: 103 MMHG | OXYGEN SATURATION: 100 % | BODY MASS INDEX: 28.65 KG/M2 | RESPIRATION RATE: 16 BRPM | HEIGHT: 65 IN | HEART RATE: 73 BPM | DIASTOLIC BLOOD PRESSURE: 69 MMHG | TEMPERATURE: 97.7 F

## 2018-12-01 LAB
ANION GAP SERPL CALC-SCNC: 8 MMOL/L
BUN SERPL-MCNC: 13 MG/DL (ref 8–23)
CALCIUM SERPL-MCNC: 8.4 MG/DL (ref 8.3–10.4)
CHLORIDE SERPL-SCNC: 108 MMOL/L (ref 98–107)
CO2 SERPL-SCNC: 25 MMOL/L (ref 21–32)
CREAT SERPL-MCNC: 0.7 MG/DL (ref 0.6–1)
GLUCOSE SERPL-MCNC: 108 MG/DL (ref 65–100)
HGB BLD-MCNC: 11 G/DL (ref 11.7–15.4)
POTASSIUM SERPL-SCNC: 4.1 MMOL/L (ref 3.5–5.1)
SODIUM SERPL-SCNC: 141 MMOL/L (ref 136–145)

## 2018-12-01 PROCEDURE — 74011250636 HC RX REV CODE- 250/636: Performed by: ORTHOPAEDIC SURGERY

## 2018-12-01 PROCEDURE — 97535 SELF CARE MNGMENT TRAINING: CPT

## 2018-12-01 PROCEDURE — 85018 HEMOGLOBIN: CPT

## 2018-12-01 PROCEDURE — 36415 COLL VENOUS BLD VENIPUNCTURE: CPT

## 2018-12-01 PROCEDURE — 97150 GROUP THERAPEUTIC PROCEDURES: CPT

## 2018-12-01 PROCEDURE — 97110 THERAPEUTIC EXERCISES: CPT

## 2018-12-01 PROCEDURE — 97116 GAIT TRAINING THERAPY: CPT

## 2018-12-01 PROCEDURE — 80048 BASIC METABOLIC PNL TOTAL CA: CPT

## 2018-12-01 PROCEDURE — 74011250637 HC RX REV CODE- 250/637: Performed by: ORTHOPAEDIC SURGERY

## 2018-12-01 RX ADMIN — ASPIRIN 325 MG: 325 TABLET, DELAYED RELEASE ORAL at 08:46

## 2018-12-01 RX ADMIN — KETOROLAC TROMETHAMINE 15 MG: 15 INJECTION, SOLUTION INTRAMUSCULAR; INTRAVENOUS at 05:33

## 2018-12-01 RX ADMIN — OXYCODONE HYDROCHLORIDE AND ACETAMINOPHEN 500 MG: 500 TABLET ORAL at 08:47

## 2018-12-01 RX ADMIN — SENNOSIDES AND DOCUSATE SODIUM 2 TABLET: 8.6; 5 TABLET ORAL at 08:47

## 2018-12-01 RX ADMIN — Medication 10 ML: at 05:34

## 2018-12-01 RX ADMIN — CYCLOBENZAPRINE HYDROCHLORIDE 5 MG: 10 TABLET, FILM COATED ORAL at 08:46

## 2018-12-01 RX ADMIN — ACETAMINOPHEN 1000 MG: 500 TABLET, FILM COATED ORAL at 05:33

## 2018-12-01 RX ADMIN — GABAPENTIN 100 MG: 100 CAPSULE ORAL at 08:47

## 2018-12-01 RX ADMIN — Medication 2 G: at 04:05

## 2018-12-01 RX ADMIN — Medication 1 AMPULE: at 08:46

## 2018-12-01 RX ADMIN — CELECOXIB 200 MG: 200 CAPSULE ORAL at 08:47

## 2018-12-01 NOTE — PROGRESS NOTES
2018 Post Op day: 1 Day Post-Op Admit Diagnosis: Localized osteoarthrosis of right hip [M16.11] LAB:   
Recent Results (from the past 24 hour(s)) TYPE & SCREEN Collection Time: 18  9:22 AM  
Result Value Ref Range Crossmatch Expiration 2018 ABO/Rh(D) O POSITIVE Antibody screen NEG   
GLUCOSE, POC Collection Time: 18  9:25 AM  
Result Value Ref Range Glucose (POC) 88 65 - 100 mg/dL HEMOGLOBIN Collection Time: 18  7:09 PM  
Result Value Ref Range HGB 12.7 11.7 - 15.4 g/dL URINALYSIS W/ RFLX MICROSCOPIC Collection Time: 18  9:57 PM  
Result Value Ref Range Color YELLOW Appearance CLEAR Specific gravity 1.004 1.001 - 1.023    
 pH (UA) 7.0 5.0 - 9.0 Protein NEGATIVE  NEG mg/dL Glucose NEGATIVE  mg/dL Ketone NEGATIVE  NEG mg/dL Bilirubin NEGATIVE  NEG Blood NEGATIVE  NEG Urobilinogen 0.2 0.2 - 1.0 EU/dL Nitrites NEGATIVE  NEG Leukocyte Esterase NEGATIVE  NEG    
HEMOGLOBIN Collection Time: 18  4:29 AM  
Result Value Ref Range HGB 11.0 (L) 11.7 - 15.4 g/dL METABOLIC PANEL, BASIC Collection Time: 18  4:29 AM  
Result Value Ref Range Sodium 141 136 - 145 mmol/L Potassium 4.1 3.5 - 5.1 mmol/L Chloride 108 (H) 98 - 107 mmol/L  
 CO2 25 21 - 32 mmol/L Anion gap 8 mmol/L Glucose 108 (H) 65 - 100 mg/dL BUN 13 8 - 23 MG/DL Creatinine 0.70 0.6 - 1.0 MG/DL  
 GFR est AA >60 >60 ml/min/1.73m2 GFR est non-AA >60 ml/min/1.73m2 Calcium 8.4 8.3 - 10.4 MG/DL Vital Signs:   
Patient Vitals for the past 8 hrs: 
 BP Temp Pulse Resp SpO2  
18 0700 118/69 98.5 °F (36.9 °C) 84 20 98 % 18 0336 103/66 98.4 °F (36.9 °C) 65 16 95 % Temp (24hrs), Av.8 °F (36.6 °C), Min:97.2 °F (36.2 °C), Max:98.5 °F (36.9 °C) Pain Control:  
Pain Assessment Pain Scale 1: Visual 
Pain Intensity 1: 3 Pain Onset 1: 16 years ago Pain Location 1: Hip Pain Orientation 1: Right Pain Description 1: Other (comment)(scratch) Pain Intervention(s) 1: Ice Subjective: Doing well, no complaints Objective:  No Acute Distress, Alert and Oriented, Neurovascular exam is normal 
  
 
Assessment:  
Patient Active Problem List  
Diagnosis Code  OA (osteoarthritis) of hip M16.9 Status Post Procedure(s) (LRB): HIP ARTHROPLASTY TOTAL/ RIGHT/ XIOMARA (Right) Plan: Continue Physical Therapy, Monitor Hgb. Home today.   
Signed By: Colton Toro MD

## 2018-12-01 NOTE — DISCHARGE INSTRUCTIONS
[address] [facility]   Patient Discharge Instructions    Vasiliy Shepard / [id number] : 1955    Admitted 2018 Discharged: 2018     IF YOU HAVE ANY PROBLEMS ONCE YOU ARE AT HOME CALL THE FOLLOWING NUMBERS:   Main office number: [phone]      · It is important that you take the medication exactly as they are prescribed. · Keep your medication in the bottles provided by the pharmacist and keep a list of the medication names, dosages, and times to be taken in your wallet. · Do not take other medications without consulting your doctor. What to do at [address] [place] [address] Ave your prehospital diet. If you have excessive nausea or vomitting call your doctor's office     Home Physical Therapy is arranged. Use rolling walker when walking. Patients who have had a joint replacement should not drive until you are seen for your follow up appointment by Dr. Yanni Churchill. When to Call    - Call if you have a temperature greater then 101  - Unable to keep food down  - Loose control of your bladder or bowel function  - Are unable to bear any weight   - Need a pain medication refill     Information obtained by :  I understand that if any problems occur once I am at home I am to contact my physician. I understand and acknowledge receipt of the instructions indicated above.                                                                                                                                            Physician's or R.N.'s Signature                                                                  Date/Time                                                                                                                                              Patient or Representative Signature                                                          Date/Time

## 2018-12-01 NOTE — PROGRESS NOTES
Care Management Interventions Transition of Care Consult (CM Consult): Home Health [address]: Yes Discharge Durable Medical Equipment: [facility] from [facility]) Current Support Network: Lives with Spouse Plan discussed with Pt/Family/Caregiver: Yes Freedom of Choice Offered: Yes Discharge Location Discharge Placement: Home with home health

## 2018-12-01 NOTE — PROGRESS NOTES
Problem: Mobility Impaired (Adult and Pediatric) Goal: *Acute Goals and Plan of Care (Insert Text) GOALS (1-4 days): 
(1.)Ms. Ding will move from supine to sit and sit to supine  in bed with MODIFIED INDEPENDENCE. (2.)Ms. Ding will transfer from bed to chair and chair to bed with SUPERVISION using the least restrictive device. Met 12/1 
(3.)Ms. Ding will ambulate with SUPERVISION for 200 feet with the least restrictive device. Met 12/1 
(4.)Ms. Ding will ambulate up/down 1 steps with No railing with STAND BY ASSIST with walker. Met 12/1 
(5.)Ms. Ding will state/observe NURA precautions with No verbal cues. ________________________________________________________________________________________________ PHYSICAL THERAPY Joint camp Nura: Daily Note, Discharge, Treatment Day: 1st, PM 12/1/2018INPATIENT: Hospital Day: 2 Payor: [id number] / Plan: [facility] [place], [facility]. / Product Type: Commerical /  
  
NAME/AGE/GENDER: Parminder Skaggs is a 59 y.o. female PRIMARY DIAGNOSIS:  Localized osteoarthrosis of right hip [M16.11] Procedure(s) and Anesthesia Type: 
   * HIP ARTHROPLASTY TOTAL/ RIGHT/ XIOMARA - Spinal (Right) ICD-10: Treatment Diagnosis:  
 · Pain in Right Hip (M25.551) · Stiffness of Right Hip, Not elsewhere classified (M25.651) · Difficulty in walking, Not elsewhere classified (R26.2) ASSESSMENT:  
Ms. Brii Falcon showed increased gait distance & improved level of assist for transfers & gait in am session. In pm session pt continued to show steady gains with functional mobility & with tolerance to exercises. Ms. Juan Jose Coyle functional progress occurred more rapidly than expected as evidenced by goal attainment. She will be discharged to her home environment with a PT HEP, assistive device(s), and Home Health PT services. This section established at most recent assessment PROBLEM LIST (Impairments causing functional limitations): 1. Decreased Strength 2. Decreased ADL/Functional Activities 3. Decreased Transfer Abilities 4. Decreased Ambulation Ability/Technique 5. Decreased Balance 6. Increased Pain 7. Decreased Activity Tolerance 8. Decreased Knowledge of Precautions 9. Decreased [place] with Home Exercise Program 
 INTERVENTIONS PLANNED: (Benefits and precautions of physical therapy have been discussed with the patient.) 1. Bed Mobility 2. Gait Training 3. Home Exercise Program (HEP) 4. Therapeutic Exercise/Strengthening 5. Transfer Training 6. Range of Motion: active/assisted/passive 7. Therapeutic Activities 8. Group Therapy TREATMENT PLAN: Frequency/Duration: Follow patient BID for duration of hospital stay to address above goals. Rehabilitation Potential For Stated Goals: Good RECOMMENDED REHABILITATION/EQUIPMENT: (at time of discharge pending progress): Continue Skilled Therapy and Home Health: Physical Therapy. HISTORY:  
History of Present Injury/Illness (Reason for Referral): Right NURA Past Medical History/Comorbidities: Ms. Marcus Barron  has a past medical history of Arthritis. Ms. Marcus Barron  has a past surgical history that includes hx breast biopsy (Right, ) and hx  section. Social History/Living Environment:  
Home Environment: Private residence # Steps to Enter: 1 Rails to Enter: No 
One/Two Story Residence: One story Living Alone: No 
Support Systems: Spouse/Significant Other/Partner Patient Expects to be Discharged to[de-identified] Private residence Current DME Used/Available at Home: None Prior Level of Function/Work/Activity: Pt was functioning with a cane ~ 6 weeks prior to this admission Number of Personal Factors/Comorbidities that affect the Plan of Care: 1-2: MODERATE COMPLEXITY EXAMINATION:  
Most Recent Physical Functioning:  
  
  
 
         
 
  
 
Bed Mobility Supine to Sit: (NT) Sit to Supine: Stand-by assistance Scooting: Supervision Transfers Sit to Stand: Supervision Stand to Sit: Supervision Bed to Chair: Supervision(with walker) Balance Sitting: Intact; Without support Standing: Intact; With support(walker) Weight Bearing Status Right Side Weight Bearing: As tolerated Distance (ft): 206 Feet (ft)( x 2) Ambulation - Level of Assistance: Supervision Assistive Device: Walker, rolling Speed/Linda: Fluctuations Step Length: Left shortened Stance: Right decreased Gait Abnormalities: Antalgic;Decreased step clearance Number of Stairs Trained: 1 Stairs - Level of Assistance: Stand-by assistance Rail Use: (none, simulate walker) Braces/Orthotics: none Right Hip Cold Type: Cold/ice packs Body Structures Involved: 1. Joints 2. Muscles Body Functions Affected: 1. Sensory/Pain 2. Movement Related Activities and Participation Affected: 1. General Tasks and Demands 2. Mobility Number of elements that affect the Plan of Care: 4+: HIGH COMPLEXITY CLINICAL PRESENTATION:  
Presentation: Stable and uncomplicated: LOW COMPLEXITY CLINICAL DECISION MAKIN[address] [place] [address] AM-PAC 6 Clicks Basic Mobility Inpatient Short Form How much difficulty does the patient currently have. .. Unable A Lot A Little None 1. Turning over in bed (including adjusting bedclothes, sheets and blankets)? [] 1   [] 2   [] 3   [x] 4  
2. Sitting down on and standing up from a chair with arms ( e.g., wheelchair, bedside commode, etc.)   [] 1   [] 2   [] 3   [x] 4  
3. Moving from lying on back to sitting on the side of the bed? [] 1   [] 2   [] 3   [x] 4 How much help from another person does the patient currently need. .. Total A Lot A Little None 4. Moving to and from a bed to a chair (including a wheelchair)? [] 1   [] 2   [] 3   [x] 4  
5. Need to walk in hospital room? [] 1   [] 2   [] 3   [x] 4  
6. Climbing 3-5 steps with a railing? [] 1   [] 2   [x] 3   [] 4  
© , Trustees of [address] [place] [id number], under license to [employer].  All rights reserved Score:  Initial: 16 Most Recent: 23 (Date: 12/1/18 ) Interpretation of Tool:  Represents activities that are increasingly more difficult (i.e. Bed mobility, Transfers, Gait). Score 24 23 22-20 19-15 14-10 9-7 6 Modifier CH CI CJ CK CL CM CN   
 
? Mobility - Walking and Moving Around:  
  - CURRENT STATUS: CK - 40%-59% impaired, limited or restricted  - GOAL STATUS: CJ - 20%-39% impaired, limited or restricted  - D/C STATUS:  CI - 1%-19% impaired, limited or restricted Payor: [employer] / Plan: [place] [employer] / Product Type: Commerical /   
 
Medical Necessity:    
· Patient is expected to demonstrate progress in strength, balance, coordination and functional technique to decrease assistance required with bed mobility, trasnfers & gait. Reason for Services/Other Comments: 
· Patient continues to require skilled intervention due to pt not independent with functional mobility. Use of outcome tool(s) and clinical judgement create a POC that gives a: Clear prediction of patient's progress: LOW COMPLEXITY  
  
 
 
 
TREATMENT:  
(In addition to Assessment/Re-Assessment sessions the following treatments were rendered) Pre-treatment Symptoms/Complaints:  none Pain: Initial: visual scale Pain Intensity 1: 3 Pain Location 1: Hip Pain Orientation 1: Right Pain Intervention(s) 1: Ambulation/Increased Activity  Post Session:  3/10 Therapeutic Exercise: (45 Minutes(group)):  Exercises per grid below to improve mobility and dynamic movement of leg - right to improve functional endurance. Required minimal verbal cues to promote proper body alignment and promote proper body mechanics. Progressed repetitions as indicated.  
Gait Training (15 Minutes):  Gait training to improve and/or restore physical functioning as related to mobility, strength, balance, coordination and dynamic movement of leg - right to improve functional gait.  Ambulated 206 Feet (ft)( x 2) with Supervision using a Walker, rolling and minimal cues related to their stride length and heel strike to promote proper body alignment and promote proper body mechanics. Date: 
11/30 Date: 
12/1 Date: 
  
ACTIVITY/EXERCISE AM PM AM PM AM PM  
GROUP THERAPY  []  []  []  [x]  []  [] Ankle Pumps  10 15 15 Quad Sets  10 15 15 Gluteal Sets  10 15 15 Hip ABd/ADduction  10 15 15 Straight Leg Raises  -- -- --    
Knee Slides  10 15 15 Short Arc Quads  10 15 15 [address]  10 15 15 Chair Slides B = bilateral; AA = active assistive; A = active; P = passive Treatment/Session Assessment:   
 Response to Treatment: tolerated well Education: 
[x] Home Exercises [x] Fall Precautions [x] Hip Precautions [x] D/C Instruction Review [x] Knee/Hip Prosthesis Review [x] Walker Management/Safety [] Adaptive Equipment as Needed Interdisciplinary Collaboration:  
o Registered Nurse After treatment position/precautions:  
o Supine in bed 
o Bed/Chair-wheels locked 
o Bed in low position 
o Caregiver at bedside 
o Call light within reach 
o RN notified 
o Family at bedside Compliance with Program/Exercises: Will assess as treatment progresses. Recommendations/Intent for next treatment session:  Treatment next visit will focus on increasing Ms. Ding's independence with bed mobility, transfers, gait training, strength/ROM exercises, modalities for pain, and patient education. Total Treatment Duration: PT Patient Time In/Time Out Time In: 1300 Time Out: 1400 Ivonne Davis PT

## 2018-12-01 NOTE — PROGRESS NOTES
Problem: Mobility Impaired (Adult and Pediatric) Goal: *Acute Goals and Plan of Care (Insert Text) GOALS (1-4 days): 
(1.)Ms. Ding will move from supine to sit and sit to supine  in bed with MODIFIED INDEPENDENCE. (2.)Ms. Ding will transfer from bed to chair and chair to bed with SUPERVISION using the least restrictive device. (3.)Ms. Ding will ambulate with SUPERVISION for 200 feet with the least restrictive device. (4.)Ms. Ding will ambulate up/down 1 steps with No railing with STAND BY ASSIST with walker. (5.)Ms. Ding will state/observe NURA precautions with No verbal cues. ________________________________________________________________________________________________ PHYSICAL THERAPY Joint camp Nura: Daily Note, Treatment Day: 1st, AM 12/1/2018INPATIENT: Hospital Day: 2 Payor: [id number] [place] [address] Hwy / Plan: [place] [facility] / Product Type: Commerical /  
  
NAME/AGE/GENDER: Sharron Hauser is a 59 y.o. female PRIMARY DIAGNOSIS:  Localized osteoarthrosis of right hip [M16.11] Procedure(s) and Anesthesia Type: 
   * HIP ARTHROPLASTY TOTAL/ RIGHT/ XIOMARA - Spinal (Right) ICD-10: Treatment Diagnosis:  
 · Pain in Right Hip (M25.551) · Stiffness of Right Hip, Not elsewhere classified (M25.651) · Difficulty in walking, Not elsewhere classified (R26.2) ASSESSMENT:  
Ms. Shelly Vaughan showed increased gait distance & improved level of assist for transfers & gait in am session. This section established at most recent assessment PROBLEM LIST (Impairments causing functional limitations): 1. Decreased Strength 2. Decreased ADL/Functional Activities 3. Decreased Transfer Abilities 4. Decreased Ambulation Ability/Technique 5. Decreased Balance 6. Increased Pain 7. Decreased Activity Tolerance 8. Decreased Knowledge of Precautions 9.  Decreased [place] with Home Exercise Program 
 INTERVENTIONS PLANNED: (Benefits and precautions of physical therapy have been discussed with the patient.) 1. Bed Mobility 2. Gait Training 3. Home Exercise Program (HEP) 4. Therapeutic Exercise/Strengthening 5. Transfer Training 6. Range of Motion: active/assisted/passive 7. Therapeutic Activities 8. Group Therapy TREATMENT PLAN: Frequency/Duration: Follow patient BID for duration of hospital stay to address above goals. Rehabilitation Potential For Stated Goals: Good RECOMMENDED REHABILITATION/EQUIPMENT: (at time of discharge pending progress): Continue Skilled Therapy and Home Health: Physical Therapy. HISTORY:  
History of Present Injury/Illness (Reason for Referral): Right NURA Past Medical History/Comorbidities: Ms. Reina Ledezma  has a past medical history of Arthritis. Ms. Reina Ledezma  has a past surgical history that includes hx breast biopsy (Right, ) and hx  section. Social History/Living Environment:  
Home Environment: Private residence # Steps to Enter: 1 Rails to Enter: No 
One/Two Story Residence: One story Living Alone: No 
Support Systems: Spouse/Significant Other/Partner Patient Expects to be Discharged to[de-identified] Private residence Current DME Used/Available at Home: None Prior Level of Function/Work/Activity: Pt was functioning with a cane ~ 6 weeks prior to this admission Number of Personal Factors/Comorbidities that affect the Plan of Care: 1-2: MODERATE COMPLEXITY EXAMINATION:  
Most Recent Physical Functioning:  
  
  
 
         
 
  
 
Bed Mobility Supine to Sit: (NT) Sit to Supine: (NT) Scooting: Stand-by assistance Transfers Sit to Stand: Stand-by assistance Stand to Sit: Stand-by assistance Bed to Chair: Stand-by assistance(with walker) Balance Sitting: Intact; Without support Standing: Impaired; With support(walker) Weight Bearing Status Right Side Weight Bearing: As tolerated Distance (ft): 150 Feet (ft) Ambulation - Level of Assistance: Stand-by assistance Assistive Device: Walker, rolling Speed/Linda: Delayed Step Length: Left shortened Stance: Right decreased Gait Abnormalities: Antalgic;Decreased step clearance Braces/Orthotics: none Right Hip Cold Type: Cold/ice packs Body Structures Involved: 1. Joints 2. Muscles Body Functions Affected: 1. Sensory/Pain 2. Movement Related Activities and Participation Affected: 1. General Tasks and Demands 2. Mobility Number of elements that affect the Plan of Care: 4+: HIGH COMPLEXITY CLINICAL PRESENTATION:  
Presentation: Stable and uncomplicated: LOW COMPLEXITY CLINICAL DECISION MAKING:  
[facility] [place]-PAC 6 Clicks Basic Mobility Inpatient Short Form How much difficulty does the patient currently have. .. Unable A Lot A Little None 1. Turning over in bed (including adjusting bedclothes, sheets and blankets)? [] 1   [] 2   [x] 3   [] 4  
2. Sitting down on and standing up from a chair with arms ( e.g., wheelchair, bedside commode, etc.)   [] 1   [] 2   [x] 3   [] 4  
3. Moving from lying on back to sitting on the side of the bed? [] 1   [] 2   [x] 3   [] 4 How much help from another person does the patient currently need. .. Total A Lot A Little None 4. Moving to and from a bed to a chair (including a wheelchair)? [] 1   [] 2   [x] 3   [] 4  
5. Need to walk in hospital room? [] 1   [] 2   [x] 3   [] 4  
6. Climbing 3-5 steps with a railing? [x] 1   [] 2   [] 3   [] 4  
© 2007, Trustees of [facility], under license to [employer]. All rights reserved Score:  Initial: 16 Most Recent: X (Date: -- ) Interpretation of Tool:  Represents activities that are increasingly more difficult (i.e. Bed mobility, Transfers, Gait). Score 24 23 22-20 19-15 14-10 9-7 6 Modifier CH CI CJ CK CL CM CN   
 
? Mobility - Walking and Moving Around:  
  - CURRENT STATUS: CK - 40%-59% impaired, limited or restricted  - GOAL STATUS: CJ - 20%-39% impaired, limited or restricted  - D/C STATUS:  ---------------To be determined--------------- Payor: [employer] / Plan: [place] [employer] / Product Type: Commerical /   
 
Medical Necessity:    
· Patient is expected to demonstrate progress in strength, balance, coordination and functional technique to decrease assistance required with bed mobility, trasnfers & gait. Reason for Services/Other Comments: 
· Patient continues to require skilled intervention due to pt not independent with functional mobility. Use of outcome tool(s) and clinical judgement create a POC that gives a: Clear prediction of patient's progress: LOW COMPLEXITY  
  
 
 
 
TREATMENT:  
(In addition to Assessment/Re-Assessment sessions the following treatments were rendered) Pre-treatment Symptoms/Complaints:  Pt pleased with progress so far 
Pain: Initial: visual scale Pain Intensity 1: 3 Pain Location 1: Hip Pain Orientation 1: Right Pain Intervention(s) 1: Ambulation/Increased Activity, Cold pack  Post Session:  3/10 Therapeutic Exercise: (13 Minutes):  Exercises per grid below to improve mobility and dynamic movement of leg - right to improve functional endurance. Required minimal verbal cues to promote proper body alignment and promote proper body mechanics. Progressed repetitions as indicated. Gait Training (12 Minutes):  Gait training to improve and/or restore physical functioning as related to mobility, strength, balance, coordination and dynamic movement of leg - right to improve functional gait. Ambulated 150 Feet (ft) with Stand-by assistance using a Walker, rolling and minimal cues related to their stride length and heel strike to promote proper body alignment and promote proper body mechanics. Date: 
11/30 Date: 
12/1 Date: 
  
ACTIVITY/EXERCISE AM PM AM PM AM PM  
GROUP THERAPY  []  []  []  []  []  [] Ankle Pumps  10 15 Quad Sets  10 15 Gluteal Sets  10 15 Hip ABd/ADduction  10 15     
 Straight Leg Raises  -- --     
Knee Slides  10 15 Short Arc Quads  10 15 [address]  10 15 Chair Slides B = bilateral; AA = active assistive; A = active; P = passive Treatment/Session Assessment:   
 Response to Treatment: pt eager to walk with PT  
 Education: 
[x] Home Exercises [x] Fall Precautions [x] Hip Precautions [] D/C Instruction Review 
[] Knee/Hip Prosthesis Review [x] Walker Management/Safety [] Adaptive Equipment as Needed Interdisciplinary Collaboration:  
o Registered Nurse After treatment position/precautions:  
o Up in chair 
o Bed/Chair-wheels locked 
o Caregiver at bedside 
o Call light within reach 
o RN notified 
o Family at bedside Compliance with Program/Exercises: Will assess as treatment progresses. Recommendations/Intent for next treatment session:  Treatment next visit will focus on increasing Ms. Ding's independence with bed mobility, transfers, gait training, strength/ROM exercises, modalities for pain, and patient education. Total Treatment Duration: PT Patient Time In/Time Out Time In: 0915 Time Out: 0402 Dayanara Thakkar, PT

## 2018-12-01 NOTE — PROGRESS NOTES
Hernandez removed per MD order. Pt tolerated well. No complaints at this time. Call light within reach. Instructed to call for bathroom assistance.

## 2018-12-01 NOTE — PROGRESS NOTES
Shift assessment complete. A&O. No complaints at this time. Neurovascular checks remain in place as ordered, all WDL at this time. No distress noted. Respirations even and unlabored. Hernandez patent and draining clear yellow urine. IVF infusing without difficulty. Call light within reach.

## 2018-12-01 NOTE — PROGRESS NOTES
Shift Assessment Complete. Pt is post op day 1 from [place]. Pt is A&Ox 3.  +2 pedal pulses with purposeful movement in all four extremities. Dressing is clean, dry and intact. Pt denies any pain or need at this time. Bed low and locked. Side rails x3. Call light with in reach. Pt verbalizes understanding of call light.

## 2018-12-01 NOTE — PROGRESS NOTES
Problem: Self Care Deficits Care Plan (Adult) Goal: *Acute Goals and Plan of Care (Insert Text) GOALS:  
DISCHARGE GOALS (in preparation for going home/rehab):  3 days 1. Ms. Guero Morillo will perform one lower body dressing activity with minimal assistance with adaptive equipment to demonstrate improved functional mobility and safety. GOAL MET 12/1/2018 2. Ms. Guero Morillo will perform one lower body bathing activity with minimal  assistance with adaptive equipment to demonstrate improved functional mobility and safety. GOAL MET 12/1/2018 3. Ms. Guero Morillo will perform toileting/toilet transfer with contact guard assistance with adaptive equipment to demonstrate improved functional mobility and safety. GOAL MET 12/1/2018 4. Ms. Guero Morillo will perform shower transfer with contact guard assistance with adaptive equipment to demonstrate improved functional mobility and safety. GOAL MET 12/1/2018 5. Ms. Guero Morillo will state NURA precautions with two verbal cues to demonstrate improved functional mobility and safety. GOAL MET 12/1/2018 JOINT CAMP OCCUPATIONAL THERAPY NURA: Daily Note, Discharge, Treatment Day: 1st and AM 12/1/2018INPATIENT: Hospital Day: 2 Payor: [address] [place] [address] Hwy / Plan: [place] [employer]. / Product Type: Commerical /  
  
NAME/AGE/GENDER: Kem Lopez is a 59 y.o. female PRIMARY DIAGNOSIS:  Localized osteoarthrosis of right hip [M16.11] Procedure(s) and Anesthesia Type: 
   * HIP ARTHROPLASTY TOTAL/ RIGHT/ XIOMARA - Spinal (Right) ICD-10: Treatment Diagnosis:  
 · Pain in Right Hip (M25.551) · Stiffness of Right Hip, Not elsewhere classified (M25.651) ASSESSMENT:  
 Ms. Guero Morillo is s/p R NURA and presents with decreased weight bearing on R LE and decreased independence with functional mobility and activities of daily living. Patient completed shower and dressing as charter below in ADL grid and is ambulating with rolling walker with supervision.   Patient has met 5/5 goals and plans to return home with good family support. Family able to provide patient with appropriate level of assistance at this time. OT reviewed hip precautions throughout session and issued long handled sponge for home use. Patient instructed to call for assistance when needing to get up from recliner and all needs in reach. Patient verbalized understanding of call light. This section established at most recent assessment PROBLEM LIST (Impairments causing functional limitations): 1. Decreased Strength 2. Decreased ADL/Functional Activities 3. Decreased Transfer Abilities 4. Increased Pain 5. Increased Fatigue 6. Decreased Flexibility/Joint Mobility 7. Decreased Knowledge of Precautions INTERVENTIONS PLANNED: (Benefits and precautions of occupational therapy have been discussed with the patient.) 1. Activities of daily living training 2. Adaptive equipment training 3. Balance training 4. Clothing management 5. Donning&doffing training 6. Theraputic activity TREATMENT PLAN: Frequency/Duration: Follow patient 1-2 times to address above goals. Rehabilitation Potential For Stated Goals: Good RECOMMENDED REHABILITATION/EQUIPMENT: (at time of discharge pending progress): Continue Skilled Therapy and Home Health: Physical Therapy. OCCUPATIONAL PROFILE AND HISTORY:  
History of Present Injury/Illness (Reason for Referral): Pt presents this date s/p (right) NURA. Past Medical History/Comorbidities: Ms. Geremias García  has a past medical history of Arthritis. Ms. Greemias García  has a past surgical history that includes hx breast biopsy (Right, ) and hx  section. Social History/Living Environment:  
Home Environment: Private residence # Steps to Enter: 1 Rails to Enter: No 
One/Two Story Residence: One story Living Alone: No 
Support Systems: Spouse/Significant Other/Partner Patient Expects to be Discharged to[de-identified] Private residence Current DME Used/Available at Home: None Prior Level of Function/Work/Activity: 
Independent Number of Personal Factors/Comorbidities that affect the Plan of Care: Brief history (0):  LOW COMPLEXITY ASSESSMENT OF OCCUPATIONAL PERFORMANCE[de-identified]  
Most Recent Physical Functioning:  
Balance Sitting: Intact Standing: Pull to stand; With support LLE Assessment LLE Assessment (WDL): Within defined limits Mental Status Neurologic State: Alert Orientation Level: Oriented X4 Cognition: Appropriate decision making; Appropriate for age attention/concentration Perception: Appears intact Perseveration: No perseveration noted Safety/Judgement: Awareness of environment RLE Assessment RLE Assessment (WDL): Exceptions to [facility] Basic ADLs (From Assessment) Complex ADLs (From Assessment) Basic ADL Feeding: Independent Oral Facial Hygiene/Grooming: Supervision Bathing: Moderate assistance Type of Bath: Chlorhexidine (CHG), Shower Upper Body Dressing: Supervision Lower Body Dressing: Moderate assistance Grooming/Bathing/Dressing Activities of Daily Living Grooming Washing Face: Independent Brushing Teeth: Independent Brushing/Combing Hair: Independent Cognitive Retraining Safety/Judgement: Awareness of environment Upper Body Bathing Bathing Assistance: Modified independent Position Performed: Seated in chair Adaptive Equipment: Shower chair Lower Body Bathing Bathing Assistance: Modified independent Perineal  : Independent Lower Body : Modified independent Adaptive Equipment: Shower chair; Long handled sponge Upper Body Dressing Assistance Dressing Assistance: Independent Bra: Independent Pullover Shirt: Independent Functional Transfers Shower Transfer: Modified independent Lower Body Dressing Assistance Dressing Assistance: Modified independent Underpants: Modified independent Pants With Elastic Waist: Modified independent Socks: Modified independent Cues: Doff; Anselmo Santillan Adaptive Equipment Used: Long handled shoe horn;Reacher;Sock aid Bed/Mat Mobility Supine to Sit: Supervision Sit to Supine: (NT) Sit to Stand: Supervision Bed to Chair: Supervision Scooting: Stand-by assistance Physical Skills Involved: 1. Range of Motion 2. Balance 3. Strength Cognitive Skills Affected (resulting in the inability to perform in a timely and safe manner): 1. none Psychosocial Skills Affected: 1. Environmental Adaptation Number of elements that affect the Plan of Care: 3-5:  MODERATE COMPLEXITY CLINICAL DECISION MAKING:  
[facility] Daily Activity Inpatient Short Form How much help from another person does the patient currently need. .. Total A Lot A Little None 1. Putting on and taking off regular lower body clothing? [] 1   [x] 2   [] 3   [] 4  
2. Bathing (including washing, rinsing, drying)? [] 1   [x] 2   [] 3   [] 4  
3. Toileting, which includes using toilet, bedpan or urinal?   [] 1   [] 2   [x] 3   [] 4  
4. Putting on and taking off regular upper body clothing? [] 1   [] 2   [] 3   [x] 4  
5. Taking care of personal grooming such as brushing teeth? [] 1   [] 2   [] 3   [x] 4  
6. Eating meals? [] 1   [] 2   [] 3   [x] 4  
© 2007, Trustees of [facility] [employer], under license to [employer]. All rights reserved Score:  Initial: 19 Most Recent: X (Date: -- ) Interpretation of Tool:  Represents activities that are increasingly more difficult (i.e. Bed mobility, Transfers, Gait). Score 24 23 22-20 19-15 14-10 9-7 6 Modifier CH CI CJ CK CL CM CN   
 
? Self Care:  
  - CURRENT STATUS: CK - 40%-59% impaired, limited or restricted  - GOAL STATUS: CJ - 20%-39% impaired, limited or restricted  - D/C STATUS:  ---------------To be determined--------------- Payor: [address] [place] [address] Hwy / Plan: [employer] / Product Type: Commerical /   
 
· Use of outcome tool(s) and clinical judgement create a POC that gives a: LOW COMPLEXITY  
  
 
 
 
TREATMENT:  
(In addition to Assessment/Re-Assessment sessions the following treatments were rendered) Pre-treatment Symptoms/Complaints:   
Pain: Initial:  
Pain Intensity 1: 0  Post Session:  0 Self Care: (40): Procedure(s) (per grid) utilized to improve and/or restore self-care/home management as related to dressing, bathing and grooming. Required min verbal cueing to facilitate activities of daily living skills. Treatment/Session Assessment:   
 Response to Treatment:  Pt up to edge of bed and to chair tolerated well. Education: 
[] Home Exercises [x] Fall Precautions [x] Hip Precautions [] Going Home Video 
[] Knee/Hip Prosthesis Review [x] Walker Management/Safety [x] Adaptive Equipment as Needed Interdisciplinary Collaboration:  
o Physical Therapist 
o Certified Occupational Therapy Assistant 
o Registered Nurse After treatment position/precautions:  
o Up in chair 
o Bed/Chair-wheels locked 
o Call light within reach 
o RN notified Compliance with Program/Exercises: Compliant all of the time. Pt doing well all goals met and will do well at home with support from family. Patient will be discharged home with home health PT. No further Occupation Therapy warranted, will discharge Occupational Therapy services. Total Treatment Duration: OT Patient Time In/Time Out Time In: 0800 Time Out: 0840 Lou Ramires 272 Alea Martinez

## 2018-12-01 NOTE — PROGRESS NOTES
[address] Face to Face Encounter Patients Name: Sol Connell    YOB: 1955 Ordering Physician: Ana Kay Primary Diagnosis: RTHA Date of Face to Face:   11/30/2018 Face to Face Encounter findings are related to primary reason for home care:   yes. 1. I certify that the patient needs intermittent care as follows: physical therapy: strengthening 2. I certify that this patient is homebound, that is: 1) patient requires the use of a walker device, special transportation, or assistance of another to leave the home; or 2) patient's condition makes leaving the home medically contraindicated; and 3) patient has a normal inability to leave the home and leaving the home requires considerable and taxing effort. Patient may leave the home for infrequent and short duration for medical reasons, and occasional absences for non-medical reasons. Homebound status is due to the following functional limitations: Patient with strength deficits limiting the performance of all ADL's without caregiver assistance or the use of an assistive device. 3. I certify that this patient is under my care and that I, or a nurse practitioner or  [id number], or clinical nurse specialist, or certified nurse midwife, working with me, had a Face-to-Face Encounter that meets the physician Face-to-Face Encounter requirements. The following are the clinical findings from the [address] [place] [address] encounter that support the need for skilled services and is a summary of the encounter: See hospital chart. See attached progess note Mansoor Omer, CURLYSW 
5/31/2017 THE FOLLOWING TO BE COMPLETED BY THE COMMUNITY PHYSICIAN: 
 
I concur with the findings described above from the [facility] encounter that this patient is homebound and in need of a skilled service. Certifying Physician: _____________________________________ Printed Certifying Physician Name: _____________________________________ Date: _________________

## 2018-12-01 NOTE — PROGRESS NOTES
Assisted pt to bed from recliner. Tolerated well. No complaints at this time. Call light within reach.

## 2018-12-02 ENCOUNTER — HOME CARE VISIT (OUTPATIENT)
Dept: SCHEDULING | Facility: HOME HEALTH | Age: 63
End: 2018-12-02
Payer: COMMERCIAL

## 2018-12-02 VITALS — TEMPERATURE: 97.3 F | RESPIRATION RATE: 16 BRPM

## 2018-12-02 PROCEDURE — 400013 HH SOC

## 2018-12-02 PROCEDURE — G0299 HHS/HOSPICE OF RN EA 15 MIN: HCPCS

## 2018-12-03 ENCOUNTER — HOME CARE VISIT (OUTPATIENT)
Dept: SCHEDULING | Facility: HOME HEALTH | Age: 63
End: 2018-12-03
Payer: COMMERCIAL

## 2018-12-03 VITALS
DIASTOLIC BLOOD PRESSURE: 74 MMHG | RESPIRATION RATE: 17 BRPM | SYSTOLIC BLOOD PRESSURE: 118 MMHG | TEMPERATURE: 98.6 F | HEART RATE: 91 BPM

## 2018-12-03 PROCEDURE — G0151 HHCP-SERV OF PT,EA 15 MIN: HCPCS

## 2018-12-04 ENCOUNTER — HOME CARE VISIT (OUTPATIENT)
Dept: SCHEDULING | Facility: HOME HEALTH | Age: 63
End: 2018-12-04
Payer: COMMERCIAL

## 2018-12-04 VITALS
TEMPERATURE: 99.2 F | DIASTOLIC BLOOD PRESSURE: 76 MMHG | HEART RATE: 70 BPM | RESPIRATION RATE: 18 BRPM | SYSTOLIC BLOOD PRESSURE: 128 MMHG

## 2018-12-04 PROCEDURE — G0157 HHC PT ASSISTANT EA 15: HCPCS

## 2018-12-11 ENCOUNTER — HOME CARE VISIT (OUTPATIENT)
Dept: SCHEDULING | Facility: HOME HEALTH | Age: 63
End: 2018-12-11
Payer: COMMERCIAL

## 2018-12-11 VITALS
RESPIRATION RATE: 16 BRPM | TEMPERATURE: 97.7 F | SYSTOLIC BLOOD PRESSURE: 120 MMHG | HEART RATE: 72 BPM | DIASTOLIC BLOOD PRESSURE: 64 MMHG

## 2018-12-13 ENCOUNTER — HOME CARE VISIT (OUTPATIENT)
Dept: SCHEDULING | Facility: HOME HEALTH | Age: 63
End: 2018-12-13
Payer: COMMERCIAL

## 2018-12-13 VITALS
SYSTOLIC BLOOD PRESSURE: 118 MMHG | RESPIRATION RATE: 16 BRPM | TEMPERATURE: 97 F | DIASTOLIC BLOOD PRESSURE: 64 MMHG | HEART RATE: 76 BPM

## 2018-12-13 PROCEDURE — G0157 HHC PT ASSISTANT EA 15: HCPCS

## 2018-12-17 ENCOUNTER — HOME CARE VISIT (OUTPATIENT)
Dept: SCHEDULING | Facility: HOME HEALTH | Age: 63
End: 2018-12-17
Payer: COMMERCIAL

## 2018-12-17 VITALS
HEART RATE: 74 BPM | TEMPERATURE: 97.2 F | SYSTOLIC BLOOD PRESSURE: 118 MMHG | DIASTOLIC BLOOD PRESSURE: 64 MMHG | RESPIRATION RATE: 16 BRPM

## 2018-12-17 PROCEDURE — G0157 HHC PT ASSISTANT EA 15: HCPCS

## 2018-12-19 ENCOUNTER — HOME CARE VISIT (OUTPATIENT)
Dept: SCHEDULING | Facility: HOME HEALTH | Age: 63
End: 2018-12-19
Payer: COMMERCIAL

## 2018-12-19 VITALS
SYSTOLIC BLOOD PRESSURE: 118 MMHG | RESPIRATION RATE: 17 BRPM | TEMPERATURE: 98.4 F | HEART RATE: 78 BPM | DIASTOLIC BLOOD PRESSURE: 65 MMHG

## 2018-12-19 PROCEDURE — G0151 HHCP-SERV OF PT,EA 15 MIN: HCPCS

## 2019-06-10 ENCOUNTER — HOME HEALTH ADMISSION (OUTPATIENT)
Dept: HOME HEALTH SERVICES | Facility: HOME HEALTH | Age: 64
End: 2019-06-10

## 2022-03-18 PROBLEM — M16.9 OA (OSTEOARTHRITIS) OF HIP: Status: ACTIVE | Noted: 2018-11-30

## 2024-09-20 ENCOUNTER — TRANSCRIBE ORDERS (OUTPATIENT)
Dept: SCHEDULING | Age: 69
End: 2024-09-20

## 2024-09-20 DIAGNOSIS — Z12.31 SCREENING MAMMOGRAM FOR HIGH-RISK PATIENT: Primary | ICD-10-CM

## 2024-10-17 ENCOUNTER — HOSPITAL ENCOUNTER (OUTPATIENT)
Dept: MAMMOGRAPHY | Age: 69
Discharge: HOME | End: 2024-10-20
Payer: COMMERCIAL

## 2024-10-17 VITALS — WEIGHT: 180 LBS | HEIGHT: 65 IN | BODY MASS INDEX: 29.99 KG/M2

## 2024-10-17 DIAGNOSIS — Z12.31 ENCOUNTER FOR SCREENING MAMMOGRAM FOR HIGH-RISK PATIENT: ICD-10-CM

## 2024-10-17 PROCEDURE — 77063 BREAST TOMOSYNTHESIS BI: CPT

## (undated) DEVICE — TRAY PREP DRY W/ PREM GLV 2 APPL 6 SPNG 2 UNDPD 1 OVERWRAP

## (undated) DEVICE — AMD ANTIMICROBIAL NON-ADHERENT PAD,0.2% POLYHEXAMETHYLENE BIGUANIDE HCI (PHMB): Brand: TELFA

## (undated) DEVICE — 1010 S-DRAPE TOWEL DRAPE 10/BX: Brand: STERI-DRAPE™

## (undated) DEVICE — GOWN,AURORA,FABRIC-REINFORCED,2XL: Brand: MEDLINE

## (undated) DEVICE — FAN SPRAY KIT: Brand: PULSAVAC®

## (undated) DEVICE — SUTURE MCRYL SZ 2-0 L27IN ABSRB UD CP-1 1 L36MM 1/2 CIR REV Y266H

## (undated) DEVICE — NEEDLE HYPO 21GA L1.5IN INTRAMUSCULAR S STL LATCH BVL UP

## (undated) DEVICE — SUTURE FIBERWIRE SZ 2 W/ TAPERED NEEDLE BLUE L38IN NONABSORB BLU L26.5MM 1/2 CIRCLE AR7200

## (undated) DEVICE — HEWSON SUTURE RETRIEVER: Brand: HEWSON SUTURE RETRIEVER

## (undated) DEVICE — MEDI-VAC YANKAUER SUCTION HANDLE W/BULBOUS TIP: Brand: CARDINAL HEALTH

## (undated) DEVICE — (D)PREP SKN CHLRAPRP APPL 26ML -- CONVERT TO ITEM 371833

## (undated) DEVICE — 2000CC GUARDIAN II: Brand: GUARDIAN

## (undated) DEVICE — STOCKINETTE TUBE 6X48 -- MEDICHOICE

## (undated) DEVICE — 3M™ STERI-DRAPE™ INCISE DRAPE, XL 1051: Brand: STERI-DRAPE™

## (undated) DEVICE — BUTTON SWITCH PENCIL BLADE ELECTRODE, HOLSTER: Brand: EDGE

## (undated) DEVICE — DRAPE,HIP,W/POUCHES,STERILE: Brand: MEDLINE

## (undated) DEVICE — CONTAINER,SPECIMEN,O.R.STRL,4.5OZ: Brand: MEDLINE

## (undated) DEVICE — ABDOMINAL PAD: Brand: DERMACEA

## (undated) DEVICE — MCLASS OSCILLATING SAW BLADE 19 X 1.27 (0.050") X 90 MM: Brand: MCLASS

## (undated) DEVICE — T4 HOOD

## (undated) DEVICE — AMD ANTIMICROBIAL GAUZE SPONGES,12 PLY USP TYPE VII, 0.2% POLYHEXAMETHYLENE BIGUANIDE HCI (PHMB): Brand: CURITY

## (undated) DEVICE — SOLUTION IV DEXTROSE/SALINE 5%-0.9% 500ML - 500ML

## (undated) DEVICE — Z DISCONTINUED USE 2423037 APPLICATOR MEDICATED 3 CC CHLORHEXIDINE GLUC 2% CHLORAPREP

## (undated) DEVICE — BLADE ELECTRODE: Brand: VALLEYLAB

## (undated) DEVICE — BIPOLAR SEALER 23-112-1 AQM 6.0: Brand: AQUAMANTYS ®

## (undated) DEVICE — 1840 FOAM BLOCK NEEDLE COUNTER: Brand: DEVON

## (undated) DEVICE — SYSTEM SKIN CLSR 22CM DERMBND PRINEO

## (undated) DEVICE — 3M™ STERI-DRAPE™ INSTRUMENT POUCH 1018: Brand: STERI-DRAPE™

## (undated) DEVICE — DRAPE,U/SHT,SPLIT,FILM,60X84,STERILE: Brand: MEDLINE

## (undated) DEVICE — NEEDLE SPNL 22GA L3.5IN BLK HUB S STL REG WALL FIT STYL W/

## (undated) DEVICE — TRAY CATH 16F DRN BG LTX -- CONVERT TO ITEM 363158

## (undated) DEVICE — SOLUTION IRRIG 3000ML 0.9% SOD CHL FLX CONT 0797208] ICU MEDICAL INC]

## (undated) DEVICE — SOLUTION IV 1000ML 0.9% SOD CHL

## (undated) DEVICE — JELLY LUBRICATING 10GM PREFIL SYR LUBE

## (undated) DEVICE — SUTURE STRATAFIX SZ 3-0 L30CM NONABSORBABLE UD L19MM FS-2 SXMP2B408

## (undated) DEVICE — REM POLYHESIVE ADULT PATIENT RETURN ELECTRODE: Brand: VALLEYLAB

## (undated) DEVICE — SUTURE STRATAFIX SPRL SZ 1 L5IN ABSRB VLT CT-1 L36MM 1/2 SXPD2B401

## (undated) DEVICE — SYR 10ML LUER LOK 1/5ML GRAD --

## (undated) DEVICE — SURGICAL PROCEDURE PACK TOT HIP CDS

## (undated) DEVICE — 3000CC GUARDIAN II: Brand: GUARDIAN